# Patient Record
Sex: FEMALE | Race: WHITE | NOT HISPANIC OR LATINO | Employment: FULL TIME | URBAN - NONMETROPOLITAN AREA
[De-identification: names, ages, dates, MRNs, and addresses within clinical notes are randomized per-mention and may not be internally consistent; named-entity substitution may affect disease eponyms.]

---

## 2017-04-01 ENCOUNTER — OFFICE VISIT (OUTPATIENT)
Dept: URGENT CARE | Facility: PHYSICIAN GROUP | Age: 62
End: 2017-04-01
Payer: COMMERCIAL

## 2017-04-01 VITALS
WEIGHT: 163 LBS | BODY MASS INDEX: 26.2 KG/M2 | TEMPERATURE: 98.4 F | RESPIRATION RATE: 16 BRPM | SYSTOLIC BLOOD PRESSURE: 114 MMHG | DIASTOLIC BLOOD PRESSURE: 72 MMHG | HEIGHT: 66 IN | OXYGEN SATURATION: 96 % | HEART RATE: 104 BPM

## 2017-04-01 DIAGNOSIS — R06.2 WHEEZES: ICD-10-CM

## 2017-04-01 DIAGNOSIS — J30.2 SEASONAL ALLERGIC RHINITIS, UNSPECIFIED ALLERGIC RHINITIS TRIGGER: ICD-10-CM

## 2017-04-01 PROCEDURE — 99214 OFFICE O/P EST MOD 30 MIN: CPT | Performed by: PHYSICIAN ASSISTANT

## 2017-04-01 RX ORDER — ALBUTEROL SULFATE 90 UG/1
2 AEROSOL, METERED RESPIRATORY (INHALATION) EVERY 4 HOURS PRN
Qty: 1 INHALER | Refills: 0 | Status: SHIPPED | OUTPATIENT
Start: 2017-04-01 | End: 2017-05-11

## 2017-04-01 RX ORDER — FLUTICASONE PROPIONATE 50 MCG
1 SPRAY, SUSPENSION (ML) NASAL 2 TIMES DAILY
Qty: 1 BOTTLE | Refills: 2 | Status: SHIPPED | OUTPATIENT
Start: 2017-04-01 | End: 2017-12-11

## 2017-04-01 NOTE — PROGRESS NOTES
Chief Complaint   Patient presents with   • URI     x2 months, nasal drainage, wheezing, cough       HISTORY OF PRESENT ILLNESS: Patient is a 61 y.o. female who presents today because she has a 2 month history of nasal congestion, clear runny nose, sinus pressure, dry cough and she has noticed some wheezing. This been going on for about 2 months, she denies any fevers, chills, nausea, vomiting or diarrhea. She tried to take one Benadryl and made her feel very dry so she didn't take anything else    There are no active problems to display for this patient.      Allergies:Sulfa drugs    Current Outpatient Prescriptions Ordered in Psychiatric   Medication Sig Dispense Refill   • fluticasone (FLONASE) 50 MCG/ACT nasal spray Spray 1 Spray in nose 2 times a day. 1 Bottle 2   • albuterol 108 (90 BASE) MCG/ACT Aero Soln inhalation aerosol Inhale 2 Puffs by mouth every four hours as needed. 1 Inhaler 0   • Multiple Vitamins-Minerals (CENTRUM PO) Take  by mouth.     • Thiamine HCl (VITAMIN B-1 PO) Take  by mouth.     • naproxen (NAPROSYN) 500 MG Tab Take 1 Tab by mouth 2 times a day, with meals. 60 Tab 0     No current Epic-ordered facility-administered medications on file.       Past Medical History   Diagnosis Date   • Cancer (CMS-[facility])        Social History   Substance Use Topics   • Smoking status: Former Smoker   • Smokeless tobacco: Never Used   • Alcohol Use: No       No family status information on file.   No family history on file.    ROS:  Review of Systems   Constitutional: Negative for fever, chills, weight loss and malaise/fatigue.   HENT: Negative for ear pain, nosebleeds, positive for nasal congestion, sore throat and no neck pain.    Eyes: Negative for blurred vision.   Respiratory: Positive for cough, no sputum production, shortness of breath and has noticed wheezing.    Cardiovascular: Negative for chest pain, palpitations, orthopnea and leg swelling.   Gastrointestinal: Negative for heartburn, nausea, vomiting and  "abdominal pain.   Genitourinary: Negative for dysuria, urgency and frequency.     Exam:  Blood pressure 114/72, pulse 104, temperature 36.9 °C (98.4 °F), resp. rate 16, height 1.676 m (5' 6\"), weight 73.936 kg (163 lb), SpO2 96 %.  General:  Well nourished, well developed female in NAD  Head:Normocephalic, atraumatic  Eyes: PERRLA, EOM within normal limits, no conjunctival injection, no scleral icterus, visual fields and acuity grossly intact.  Ears: Normal shape and symmetry, no tenderness, no discharge. External canals are without any significant edema or erythema. Tympanic membranes are without any inflammation, no effusion. Gross auditory acuity is intact  Nose: Symmetrical without tenderness, no discharge. Nasal mucosa is edematous and pale bilaterally  Mouth: reasonable hygiene, no erythema exudates or tonsillar enlargement.  Neck: no masses, range of motion within normal limits, no tracheal deviation. No obvious thyroid enlargement.  Pulmonary: chest is symmetrical with respiration, 2 scattered wheezes bilaterally, no rales or rhonchi  Cardiovascular: regular rate and rhythm without murmurs, rubs, or gallops.  Extremities: no clubbing, cyanosis, or edema.    Please note that this dictation was created using voice recognition software. I have made every reasonable attempt to correct obvious errors, but I expect that there are errors of grammar and possibly content that I did not discover before finalizing the note.    Assessment/Plan:  1. Seasonal allergic rhinitis, unspecified allergic rhinitis trigger  fluticasone (FLONASE) 50 MCG/ACT nasal spray   2. Wheezes  albuterol 108 (90 BASE) MCG/ACT Aero Soln inhalation aerosol    recommended over-the-counter Zyrtec D    Followup with primary care in the next 7-10 days if not significantly improving, return to the urgent care or go to the emergency room sooner for any worsening of symptoms.         "

## 2017-04-01 NOTE — MR AVS SNAPSHOT
"        Mariana Dumont   2017 2:00 PM   Office Visit   MRN: [id number]    Department:  [facility]   Dept Phone:  [phone]    Description:  Female : 1955   Provider:  Pranav Santa PA-C           Reason for Visit     URI x2 months, nasal drainage, wheezing, cough      Allergies as of 2017     Allergen Noted Reactions    Sulfa Drugs 2011   Hives      You were diagnosed with     Seasonal allergic rhinitis, unspecified allergic rhinitis trigger   [3808466]       Wheezes   [[id number]]         Vital Signs     Blood Pressure Pulse Temperature Respirations Height Weight    114/72 mmHg 104 36.9 °C (98.4 °F) 16 1.676 m (5' 6\") 73.936 kg (163 lb)    Body Mass Index Oxygen Saturation Smoking Status             26.32 kg/m2 96% Former Smoker         Basic Information     Date Of Birth Sex Race Ethnicity Preferred Language    1955 Female White Non- English      Health Maintenance        Date Due Completion Dates    IMM DTaP/Tdap/Td Vaccine (1 - Tdap) 1973 ---    PAP SMEAR 1975 ---    MAMMOGRAM 1994 ---    IMM ZOSTER VACCINE 2014 ---    COLONOSCOPY 2026            Current Immunizations     No immunizations on file.      Below and/or attached are the medications your provider expects you to take. Review all of your home medications and newly ordered medications with your provider and/or pharmacist. Follow medication instructions as directed by your provider and/or pharmacist. Please keep your medication list with you and share with your provider. Update the information when medications are discontinued, doses are changed, or new medications (including over-the-counter products) are added; and carry medication information at all times in the event of emergency situations     Allergies:  SULFA DRUGS - Hives               Medications  Valid as of: 2017 -  2:24 PM    Generic Name Brand Name Tablet Size Instructions for use    Albuterol Sulfate " (Aero Soln) albuterol 108 (90 BASE) MCG/ACT Inhale 2 Puffs by mouth every four hours as needed.        Fluticasone Propionate (Suspension) FLONASE 50 MCG/ACT Spray 1 Spray in nose 2 times a day.        Multiple Vitamins-Minerals   Take  by mouth.        Naproxen (Tab) NAPROSYN 500 MG Take 1 Tab by mouth 2 times a day, with meals.        Thiamine HCl   Take  by mouth.        .                 Medicines prescribed today were sent to:     [id number] - [place], [place] - [address] AT [facility] [address]    [address] [facility] [address]    Phone: [phone] Fax: [phone]    Open 24 Hours?: No      Medication refill instructions:       If your prescription bottle indicates you have medication refills left, it is not necessary to call your provider’s office. Please contact your pharmacy and they will refill your medication.    If your prescription bottle indicates you do not have any refills left, you may request refills at any time through one of the following ways: The online Fashiontrot system (except Urgent Care), by calling your provider’s office, or by asking your pharmacy to contact your provider’s office with a refill request. Medication refills are processed only during regular business hours and may not be available until the next business day. Your provider may request additional information or to have a follow-up visit with you prior to refilling your medication.   *Please Note: Medication refills are assigned a new Rx number when refilled electronically. Your pharmacy may indicate that no refills were authorized even though a new prescription for the same medication is available at the pharmacy. Please request the medicine by name with the pharmacy before contacting your provider for a refill.           Fashiontrot Access Code: [email or web address]  Expires: 5/1/2017  2:24 PM    Fashiontrot  A secure, online tool to manage your health information     [employer]’s Fashiontrot® is a secure, online  tool that connects you to your personalized health information from the privacy of your home -- day or night - making it very easy for you to manage your healthcare. Once the activation process is completed, you can even access your medical information using the ID90T yola, which is available for free in the Apple Yola store or Google Play store.     ID90T provides the following levels of access (as shown below):   My Chart Features   [facility] Doctor [facility]  Specialists [facility]  Urgent  Care Non-[facility]  Primary Care  Doctor   Email your healthcare team securely and privately 24/7 X X X    Manage appointments: schedule your next appointment; view details of past/upcoming appointments X      Request prescription refills. X      View recent personal medical records, including lab and immunizations X X X X   View health record, including health history, allergies, medications X X X X   Read reports about your outpatient visits, procedures, consult and ER notes X X X X   See your discharge summary, which is a recap of your hospital and/or ER visit that includes your diagnosis, lab results, and care plan. X X       How to register for ID90T:  1. Go to  [email or web address].  2. Click on the Sign Up Now box, which takes you to the New Member Sign Up page. You will need to provide the following information:  a. Enter your ID90T Access Code exactly as it appears at the top of this page. (You will not need to use this code after you’ve completed the sign-up process. If you do not sign up before the expiration date, you must request a new code.)   b. Enter your date of birth.   c. Enter your home email address.   d. Click Submit, and follow the next screen’s instructions.  3. Create a ID90T ID. This will be your ID90T login ID and cannot be changed, so think of one that is secure and easy to remember.  4. Create a ID90T password. You can change your password at any time.  5. Enter your Password  Reset Question and Answer. This can be used at a later time if you forget your password.   6. Enter your e-mail address. This allows you to receive e-mail notifications when new information is available in CultureIQt.  7. Click Sign Up. You can now view your health information.    For assistance activating your SpotlessCity account, call [phone]

## 2017-05-11 ENCOUNTER — OFFICE VISIT (OUTPATIENT)
Dept: URGENT CARE | Facility: PHYSICIAN GROUP | Age: 62
End: 2017-05-11
Payer: COMMERCIAL

## 2017-05-11 VITALS
WEIGHT: 161 LBS | HEIGHT: 66 IN | DIASTOLIC BLOOD PRESSURE: 86 MMHG | BODY MASS INDEX: 25.88 KG/M2 | SYSTOLIC BLOOD PRESSURE: 130 MMHG | TEMPERATURE: 97.9 F | HEART RATE: 85 BPM | OXYGEN SATURATION: 96 % | RESPIRATION RATE: 18 BRPM

## 2017-05-11 DIAGNOSIS — R05.9 COUGH: ICD-10-CM

## 2017-05-11 PROCEDURE — 99214 OFFICE O/P EST MOD 30 MIN: CPT | Performed by: PHYSICIAN ASSISTANT

## 2017-05-11 RX ORDER — ALBUTEROL SULFATE 90 UG/1
2 AEROSOL, METERED RESPIRATORY (INHALATION) EVERY 6 HOURS PRN
Qty: 8.5 G | Refills: 1 | Status: SHIPPED | OUTPATIENT
Start: 2017-05-11

## 2017-05-11 RX ORDER — CODEINE PHOSPHATE AND GUAIFENESIN 10; 100 MG/5ML; MG/5ML
5 SOLUTION ORAL EVERY 6 HOURS PRN
Qty: 100 ML | Refills: 0 | Status: SHIPPED | OUTPATIENT
Start: 2017-05-11 | End: 2017-12-11

## 2017-05-11 RX ORDER — PREDNISONE 10 MG/1
TABLET ORAL
Qty: 20 TAB | Refills: 0 | Status: SHIPPED | OUTPATIENT
Start: 2017-05-11 | End: 2017-12-11

## 2017-05-11 RX ORDER — BENZONATATE 200 MG/1
200 CAPSULE ORAL 3 TIMES DAILY PRN
Qty: 30 CAP | Refills: 0 | Status: SHIPPED | OUTPATIENT
Start: 2017-05-11 | End: 2017-12-11

## 2017-05-11 ASSESSMENT — ENCOUNTER SYMPTOMS
SHORTNESS OF BREATH: 0
COUGH: 1
CHILLS: 0
RHINORRHEA: 1
SPUTUM PRODUCTION: 0
WHEEZING: 1
FEVER: 0
MYALGIAS: 0

## 2017-05-11 NOTE — MR AVS SNAPSHOT
"        Mariana Dumont   2017 4:40 PM   Office Visit   MRN: [id number]    Department:  [facility]   Dept Phone:  [phone]    Description:  Female : 1955   Provider:  JEREMIAS Gonzalez.           Reason for Visit     Cough Over a month, OTC meds with no help      Allergies as of 2017     Allergen Noted Reactions    Sulfa Drugs 2011   Hives      You were diagnosed with     Cough   [786.2.ICD-9-CM]   Nonproductive, fluctuating for couple of months, worse over the last few weeks. No wheezes. Start albuterol and Tessalon. X-ray ordered. Follow up on results      Vital Signs     Blood Pressure Pulse Temperature Respirations Height Weight    130/86 mmHg 85 36.6 °C (97.9 °F) 18 1.676 m (5' 5.98\") 73.029 kg (161 lb)    Body Mass Index Oxygen Saturation Breastfeeding? Smoking Status          26.00 kg/m2 96% No Former Smoker        Basic Information     Date Of Birth Sex Race Ethnicity Preferred Language    1955 Female White Non- English      Health Maintenance        Date Due Completion Dates    IMM DTaP/Tdap/Td Vaccine (1 - Tdap) 1973 ---    PAP SMEAR 1975 ---    MAMMOGRAM 1994 ---    IMM ZOSTER VACCINE 2014 ---    COLONOSCOPY 2026            Current Immunizations     No immunizations on file.      Below and/or attached are the medications your provider expects you to take. Review all of your home medications and newly ordered medications with your provider and/or pharmacist. Follow medication instructions as directed by your provider and/or pharmacist. Please keep your medication list with you and share with your provider. Update the information when medications are discontinued, doses are changed, or new medications (including over-the-counter products) are added; and carry medication information at all times in the event of emergency situations     Allergies:  SULFA DRUGS - Hives               Medications  Valid as of: May 11, 2017 -  5:52 " PM    Generic Name Brand Name Tablet Size Instructions for use    Albuterol Sulfate (Aero Soln) albuterol 108 (90 BASE) MCG/ACT Inhale 2 Puffs by mouth every 6 hours as needed for Shortness of Breath.        Benzonatate (Cap) TESSALON 200 MG Take 1 Cap by mouth 3 times a day as needed for Cough.        Fluticasone Propionate (Suspension) FLONASE 50 MCG/ACT Spray 1 Spray in nose 2 times a day.        Guaifenesin-Codeine (Solution) ROBITUSSIN -10 mg/5mL Take 5 mL by mouth every 6 hours as needed for Cough.        Multiple Vitamins-Minerals   Take  by mouth.        PredniSONE (Tab) DELTASONE 10 MG 2 tabs BID x 2 days, 3 tabs daily x 2 days, 2 tabs daily x 2 days, 1 tab daily x 2 days        Thiamine HCl   Take  by mouth.        .                 Medicines prescribed today were sent to:     [facility] [address] [facility], [place] - [address] [facility] [address]     [address] [facility] [address]    Phone: [phone] Fax: [phone]    Open 24 Hours?: No      Medication refill instructions:       If your prescription bottle indicates you have medication refills left, it is not necessary to call your provider’s office. Please contact your pharmacy and they will refill your medication.    If your prescription bottle indicates you do not have any refills left, you may request refills at any time through one of the following ways: The online [employer] system (except Urgent Care), by calling your provider’s office, or by asking your pharmacy to contact your provider’s office with a refill request. Medication refills are processed only during regular business hours and may not be available until the next business day. Your provider may request additional information or to have a follow-up visit with you prior to refilling your medication.   *Please Note: Medication refills are assigned a new Rx number when refilled electronically. Your pharmacy may indicate that no refills were authorized even though a new  prescription for the same medication is available at the pharmacy. Please request the medicine by name with the pharmacy before contacting your provider for a refill.        Your To Do List     Future Labs/Procedures Complete By Expires    DX-CHEST-2 VIEWS  As directed 5/11/2018      Instructions    Cough, Adult   A cough is a reflex that helps clear your throat and airways. It can help heal the body or may be a reaction to an irritated airway. A cough may only last 2 or 3 weeks (acute) or may last more than 8 weeks (chronic).   CAUSES  Acute cough:  · Viral or bacterial infections.  Chronic cough:  · Infections.  · Allergies.  · Asthma.  · Post-nasal drip.  · Smoking.  · Heartburn or acid reflux.  · Some medicines.  · Chronic lung problems (COPD).  · Cancer.  SYMPTOMS   · Cough.  · Fever.  · Chest pain.  · Increased breathing rate.  · High-pitched whistling sound when breathing (wheezing).  · Colored mucus that you cough up (sputum).  TREATMENT   · A bacterial cough may be treated with antibiotic medicine.  · A viral cough must run its course and will not respond to antibiotics.  · Your caregiver may recommend other treatments if you have a chronic cough.  HOME CARE INSTRUCTIONS   · Only take over-the-counter or prescription medicines for pain, discomfort, or fever as directed by your caregiver. Use cough suppressants only as directed by your caregiver.  · Use a cold steam vaporizer or humidifier in your bedroom or home to help loosen secretions.  · Sleep in a semi-upright position if your cough is worse at night.  · Rest as needed.  · Stop smoking if you smoke.  SEEK IMMEDIATE MEDICAL CARE IF:   · You have pus in your sputum.  · Your cough starts to worsen.  · You cannot control your cough with suppressants and are losing sleep.  · You begin coughing up blood.  · You have difficulty breathing.  · You develop pain which is getting worse or is uncontrolled with medicine.  · You have a fever.  MAKE SURE YOU:    · Understand these instructions.  · Will watch your condition.  · Will get help right away if you are not doing well or get worse.     This information is not intended to replace advice given to you by your health care provider. Make sure you discuss any questions you have with your health care provider.     Document Released: 06/15/2012 Document Revised: 03/11/2013 Document Reviewed: 02/24/2016  [employer] Interactive Patient Education ©2016 Elsevier Inc.            "[employer] Access Code: [email or web address]  Expires: 6/10/2017  5:52 PM    "[employer]."  A secure, online tool to manage your health information     [employer]’s "[employer]."® is a secure, online tool that connects you to your personalized health information from the privacy of your home -- day or night - making it very easy for you to manage your healthcare. Once the activation process is completed, you can even access your medical information using the "[employer]." yola, which is available for free in the Apple Yola store or Google Play store.     "[employer]." provides the following levels of access (as shown below):   My Chart Features   [facility] Primary [facility] Doctor [facility]  Specialists [facility]  Urgent  Care Non-[facility]  Primary Care  Doctor   Email your healthcare team securely and privately 24/7 X X X    Manage appointments: schedule your next appointment; view details of past/upcoming appointments X      Request prescription refills. X      View recent personal medical records, including lab and immunizations X X X X   View health record, including health history, allergies, medications X X X X   Read reports about your outpatient visits, procedures, consult and ER notes X X X X   See your discharge summary, which is a recap of your hospital and/or ER visit that includes your diagnosis, lab results, and care plan. X X       How to register for "[employer]:  1. Go to  [email or web address].  2. Click on the Sign Up Now box, which takes you to the New Member Sign Up page. You  will need to provide the following information:  a. Enter your Redeemr Access Code exactly as it appears at the top of this page. (You will not need to use this code after you’ve completed the sign-up process. If you do not sign up before the expiration date, you must request a new code.)   b. Enter your date of birth.   c. Enter your home email address.   d. Click Submit, and follow the next screen’s instructions.  3. Create a Sococot ID. This will be your Redeemr login ID and cannot be changed, so think of one that is secure and easy to remember.  4. Create a Redeemr password. You can change your password at any time.  5. Enter your Password Reset Question and Answer. This can be used at a later time if you forget your password.   6. Enter your e-mail address. This allows you to receive e-mail notifications when new information is available in Redeemr.  7. Click Sign Up. You can now view your health information.    For assistance activating your Redeemr account, call [phone]

## 2017-05-12 NOTE — PATIENT INSTRUCTIONS
Cough, Adult   A cough is a reflex that helps clear your throat and airways. It can help heal the body or may be a reaction to an irritated airway. A cough may only last 2 or 3 weeks (acute) or may last more than 8 weeks (chronic).   CAUSES  Acute cough:  · Viral or bacterial infections.  Chronic cough:  · Infections.  · Allergies.  · Asthma.  · Post-nasal drip.  · Smoking.  · Heartburn or acid reflux.  · Some medicines.  · Chronic lung problems (COPD).  · Cancer.  SYMPTOMS   · Cough.  · Fever.  · Chest pain.  · Increased breathing rate.  · High-pitched whistling sound when breathing (wheezing).  · Colored mucus that you cough up (sputum).  TREATMENT   · A bacterial cough may be treated with antibiotic medicine.  · A viral cough must run its course and will not respond to antibiotics.  · Your caregiver may recommend other treatments if you have a chronic cough.  HOME CARE INSTRUCTIONS   · Only take over-the-counter or prescription medicines for pain, discomfort, or fever as directed by your caregiver. Use cough suppressants only as directed by your caregiver.  · Use a cold steam vaporizer or humidifier in your bedroom or home to help loosen secretions.  · Sleep in a semi-upright position if your cough is worse at night.  · Rest as needed.  · Stop smoking if you smoke.  SEEK IMMEDIATE MEDICAL CARE IF:   · You have pus in your sputum.  · Your cough starts to worsen.  · You cannot control your cough with suppressants and are losing sleep.  · You begin coughing up blood.  · You have difficulty breathing.  · You develop pain which is getting worse or is uncontrolled with medicine.  · You have a fever.  MAKE SURE YOU:   · Understand these instructions.  · Will watch your condition.  · Will get help right away if you are not doing well or get worse.     This information is not intended to replace advice given to you by your health care provider. Make sure you discuss any questions you have with your health care provider.      Document Released: 06/15/2012 Document Revised: 03/11/2013 Document Reviewed: 02/24/2016  [employer] Interactive Patient Education ©2016 Elsevier Inc.

## 2017-05-12 NOTE — PROGRESS NOTES
Subjective:      Mariana Dumont is a 61 y.o. female who presents with Cough            HPI Comments: Patient reports cough for the last couple of months which has been fluctuating. She reports that she was seen about a month ago and given albuterol which helped a little bit, but she has run out. She does report some wheezing. No sputum, fever, or other complaints. Her daughter is concerned and would like her to get a chest x-ray today. Patient does not have a PCP as her PCP left the area a while back. She has tried OTC medications with limited improvement in symptoms. She reports the cough is not keeping her awake at night.    Cough  This is a new problem. The current episode started more than 1 month ago. The problem has been waxing and waning. The cough is non-productive. Associated symptoms include rhinorrhea and wheezing. Pertinent negatives include no chills, fever, myalgias or shortness of breath. Nothing aggravates the symptoms. She has tried a beta-agonist inhaler and OTC cough suppressant for the symptoms. The treatment provided mild relief.       Review of Systems   Constitutional: Negative for fever and chills.   HENT: Positive for rhinorrhea.    Respiratory: Positive for cough and wheezing. Negative for sputum production and shortness of breath.    Musculoskeletal: Negative for myalgias.     Allergies:Sulfa drugs    Current Outpatient Prescriptions Ordered in [facility]   Medication Sig Dispense Refill   • albuterol 108 (90 BASE) MCG/ACT Aero Soln inhalation aerosol Inhale 2 Puffs by mouth every 6 hours as needed for Shortness of Breath. 8.5 g 1   • benzonatate (TESSALON) 200 MG capsule Take 1 Cap by mouth 3 times a day as needed for Cough. 30 Cap 0   • predniSONE (DELTASONE) 10 MG Tab 2 tabs BID x 2 days, 3 tabs daily x 2 days, 2 tabs daily x 2 days, 1 tab daily x 2 days 20 Tab 0   • guaifenesin-codeine (ROBITUSSIN AC) Solution oral solution Take 5 mL by mouth every 6 hours as needed for Cough. 100 mL 0   •  "fluticasone (FLONASE) 50 MCG/ACT nasal spray Spray 1 Spray in nose 2 times a day. 1 Bottle 2   • Multiple Vitamins-Minerals (CENTRUM PO) Take  by mouth.     • Thiamine HCl (VITAMIN B-1 PO) Take  by mouth.       No current Epic-ordered facility-administered medications on file.       Past Medical History   Diagnosis Date   • Cancer (CMS-[facility])        Social History   Substance Use Topics   • Smoking status: Former Smoker   • Smokeless tobacco: Never Used   • Alcohol Use: No       No family status information on file.   History reviewed. No pertinent family history.       Objective:     /86 mmHg  Pulse 85  Temp(Src) 36.6 °C (97.9 °F)  Resp 18  Ht 1.676 m (5' 5.98\")  Wt 73.029 kg (161 lb)  BMI 26.00 kg/m2  SpO2 96%  Breastfeeding? No     Physical Exam   Constitutional: She is oriented to person, place, and time. She appears well-developed and well-nourished. No distress.   HENT:   Head: Normocephalic and atraumatic.   Neck: Normal range of motion. Neck supple.   Cardiovascular: Normal rate and regular rhythm.    Pulmonary/Chest: Effort normal and breath sounds normal. She has no wheezes. She has no rales.   Frequent harsh, nonproductive cough   Neurological: She is alert and oriented to person, place, and time.   Skin: Skin is warm and dry. She is not diaphoretic.   Psychiatric: She has a normal mood and affect. Her behavior is normal. Judgment and thought content normal.   Nursing note and vitals reviewed.    X-ray: Chest x-ray pending          Assessment/Plan:     1. Cough  albuterol 108 (90 BASE) MCG/ACT Aero Soln inhalation aerosol    DX-CHEST-2 VIEWS    benzonatate (TESSALON) 200 MG capsule    predniSONE (DELTASONE) 10 MG Tab    guaifenesin-codeine (ROBITUSSIN AC) Solution oral solution    Nonproductive, fluctuating for couple of months, worse over the last few weeks. No wheezes. Start albuterol and Tessalon. X-ray ordered. Follow up on results       [employer] Interactive Patient Education given: " Cough    Please note that this dictation was created using voice recognition software. I have made every reasonable attempt to correct obvious errors, but I expect that there are errors of grammar and possibly content that I did not discover before finalizing the note.

## 2017-05-13 ENCOUNTER — HOSPITAL ENCOUNTER (OUTPATIENT)
Dept: RADIOLOGY | Facility: MEDICAL CENTER | Age: 62
End: 2017-05-13
Attending: PHYSICIAN ASSISTANT
Payer: COMMERCIAL

## 2017-05-13 DIAGNOSIS — R05.9 COUGH: ICD-10-CM

## 2017-05-13 PROCEDURE — 71020 DX-CHEST-2 VIEWS: CPT

## 2017-12-11 ENCOUNTER — OFFICE VISIT (OUTPATIENT)
Dept: URGENT CARE | Facility: PHYSICIAN GROUP | Age: 62
End: 2017-12-11
Payer: COMMERCIAL

## 2017-12-11 VITALS
BODY MASS INDEX: 26.03 KG/M2 | RESPIRATION RATE: 20 BRPM | SYSTOLIC BLOOD PRESSURE: 136 MMHG | DIASTOLIC BLOOD PRESSURE: 64 MMHG | OXYGEN SATURATION: 99 % | WEIGHT: 162 LBS | HEIGHT: 66 IN | HEART RATE: 69 BPM | TEMPERATURE: 98.6 F

## 2017-12-11 DIAGNOSIS — L03.011 PARONYCHIA OF FINGER, RIGHT: ICD-10-CM

## 2017-12-11 PROCEDURE — 99214 OFFICE O/P EST MOD 30 MIN: CPT | Performed by: NURSE PRACTITIONER

## 2017-12-11 RX ORDER — AMOXICILLIN AND CLAVULANATE POTASSIUM 875; 125 MG/1; MG/1
1 TABLET, FILM COATED ORAL 2 TIMES DAILY
Qty: 14 TAB | Refills: 0 | Status: SHIPPED | OUTPATIENT
Start: 2017-12-11 | End: 2017-12-18

## 2017-12-11 ASSESSMENT — ENCOUNTER SYMPTOMS
NUMBNESS: 0
CHILLS: 0
EYE PAIN: 0
NAUSEA: 0
MYALGIAS: 0
VOMITING: 0
SHORTNESS OF BREATH: 0
FEVER: 0
SORE THROAT: 0
DIZZINESS: 0

## 2017-12-12 NOTE — PROGRESS NOTES
"  Subjective:     Mariana Dumont is a 62 y.o. female who presents for Finger Injury (right middle finger infection)       Other   This is a new problem. The current episode started in the past 7 days. The problem occurs constantly. The problem has been rapidly improving. Pertinent negatives include no chest pain, chills, fever, myalgias, nausea, numbness, rash, sore throat or vomiting. Nothing aggravates the symptoms. Treatments tried: drained with needle, topical abx ointment. The treatment provided mild relief.     Past Medical History:   Diagnosis Date   • Cancer (CMS-HCC)      Past Surgical History:   Procedure Laterality Date   • ABDOMINAL HYSTERECTOMY TOTAL     • PRIMARY C SECTION       Social History     Social History   • Marital status:      Spouse name: N/A   • Number of children: N/A   • Years of education: N/A     Occupational History   • Not on file.     Social History Main Topics   • Smoking status: Former Smoker   • Smokeless tobacco: Never Used   • Alcohol use No   • Drug use: No   • Sexual activity: Not on file     Other Topics Concern   • Not on file     Social History Narrative   • No narrative on file    History reviewed. No pertinent family history. Review of Systems   Constitutional: Negative for chills and fever.   HENT: Negative for sore throat.    Eyes: Negative for pain.   Respiratory: Negative for shortness of breath.    Cardiovascular: Negative for chest pain.   Gastrointestinal: Negative for nausea and vomiting.   Genitourinary: Negative for hematuria.   Musculoskeletal: Negative for myalgias.   Skin: Negative for rash.   Neurological: Negative for dizziness and numbness.     Allergies   Allergen Reactions   • Sulfa Drugs Hives      Objective:   /64   Pulse 69   Temp 37 °C (98.6 °F)   Resp 20   Ht 1.676 m (5' 6\")   Wt 73.5 kg (162 lb)   SpO2 99%   BMI 26.15 kg/m²   Physical Exam   Constitutional: She is oriented to person, place, and time. She appears well-developed " and well-nourished. No distress.   HENT:   Head: Normocephalic and atraumatic.   Eyes: Conjunctivae and EOM are normal. Pupils are equal, round, and reactive to light.   Cardiovascular: Normal rate and regular rhythm.    No murmur heard.  Pulmonary/Chest: Effort normal and breath sounds normal. No respiratory distress.   Abdominal: Soft. She exhibits no distension. There is no tenderness.   Neurological: She is alert and oriented to person, place, and time. She has normal reflexes. No sensory deficit.   Skin: Skin is warm and dry. There is erythema.         erythema, tender to palpation of the cuticle.   Psychiatric: She has a normal mood and affect.         Assessment/Plan:   Assessment    1. Paronychia of finger, right  - amoxicillin-clavulanate (AUGMENTIN) 875-125 MG Tab; Take 1 Tab by mouth 2 times a day for 7 days.  Dispense: 14 Tab; Refill: 0     Patient was able to drain finger with a needle and get puss out. At this time and will not re-drain. We'll place on oral antibiotics. Patient advised of signs and symptoms of worsening condition. Patient advised to return to clinic    Patient given precautionary s/sx that mandate immediate follow up and evaluation in the ED. Advised of risks of not doing so.    DDX, Supportive care, and indications for immediate follow-up discussed with patient.    Instructed to return to clinic or nearest emergency department if we are not available for any change in condition, further concerns, or worsening of symptoms.    The patient demonstrated a good understanding and agreed with the treatment plan.  If not improved with oral antibiotics.

## 2017-12-14 ENCOUNTER — SUPERVISING PHYSICIAN REVIEW (OUTPATIENT)
Dept: URGENT CARE | Facility: PHYSICIAN GROUP | Age: 62
End: 2017-12-14

## 2017-12-23 ENCOUNTER — OFFICE VISIT (OUTPATIENT)
Dept: URGENT CARE | Facility: PHYSICIAN GROUP | Age: 62
End: 2017-12-23
Payer: COMMERCIAL

## 2017-12-23 VITALS
HEART RATE: 91 BPM | HEIGHT: 66 IN | DIASTOLIC BLOOD PRESSURE: 76 MMHG | OXYGEN SATURATION: 94 % | RESPIRATION RATE: 16 BRPM | WEIGHT: 162 LBS | TEMPERATURE: 100.2 F | BODY MASS INDEX: 26.03 KG/M2 | SYSTOLIC BLOOD PRESSURE: 120 MMHG

## 2017-12-23 DIAGNOSIS — J22 ACUTE RESPIRATORY INFECTION: ICD-10-CM

## 2017-12-23 PROCEDURE — 99214 OFFICE O/P EST MOD 30 MIN: CPT | Performed by: FAMILY MEDICINE

## 2017-12-23 RX ORDER — AZITHROMYCIN 250 MG/1
TABLET, FILM COATED ORAL
Qty: 6 TAB | Refills: 0 | Status: SHIPPED | OUTPATIENT
Start: 2017-12-23

## 2017-12-23 RX ORDER — CODEINE PHOSPHATE AND GUAIFENESIN 10; 100 MG/5ML; MG/5ML
SOLUTION ORAL
Qty: 240 ML | Refills: 0 | Status: SHIPPED | OUTPATIENT
Start: 2017-12-23 | End: 2017-12-30

## 2017-12-23 RX ORDER — BENZONATATE 200 MG/1
CAPSULE ORAL
Qty: 30 CAP | Refills: 0 | Status: SHIPPED | OUTPATIENT
Start: 2017-12-23

## 2017-12-23 NOTE — PROGRESS NOTES
Chief Complaint:    Chief Complaint   Patient presents with   • Cough     x2days congestion       History of Present Illness:    This is a new problem. Symptoms x 2 days; has nasal symptoms and cough. No fever that she was aware of, but she has temp of 100.2 F here. No purulent mucus. Tessalon and Guiafenesin-Codeine have been helpful for cough in past.      Review of Systems:    Constitutional: See HPI.  Eyes: Negative for change in vision, photophobia, pain, redness, and discharge.  ENT: See HPI.    Respiratory: See HPI.   Cardiovascular: Negative for chest pain, palpitations, orthopnea, claudication, leg swelling, and PND.   Gastrointestinal: Negative for abdominal pain, nausea, vomiting, diarrhea, constipation, blood in stool, and melena.   Genitourinary: Negative for dysuria, urinary urgency, urinary frequency, hematuria, and flank pain.   Musculoskeletal: Negative for myalgias, joint pain, neck pain, and back pain.   Skin: Negative for rash and itching.   Neurological: Negative for dizziness, tingling, tremors, sensory change, speech change, focal weakness, seizures, loss of consciousness, and headaches.   Endo: Negative for polydipsia.   Heme: Does not bruise/bleed easily.   Psychiatric/Behavioral: Negative for depression, suicidal ideas, hallucinations, memory loss and substance abuse. The patient is not nervous/anxious and does not have insomnia.      Past Medical History:    Past Medical History:   Diagnosis Date   • Cancer (CMS-HCC)        Past Surgical History:    Past Surgical History:   Procedure Laterality Date   • ABDOMINAL HYSTERECTOMY TOTAL     • PRIMARY C SECTION         Social History:    Social History     Social History   • Marital status:      Spouse name: N/A   • Number of children: N/A   • Years of education: N/A     Occupational History   • Not on file.     Social History Main Topics   • Smoking status: Former Smoker   • Smokeless tobacco: Never Used   • Alcohol use No   • Drug use:  "No   • Sexual activity: Not on file     Other Topics Concern   • Not on file     Social History Narrative   • No narrative on file       Family History:    History reviewed. No pertinent family history.    Medications:    Current Outpatient Prescriptions on File Prior to Visit   Medication Sig Dispense Refill   • Multiple Vitamins-Minerals (CENTRUM PO) Take  by mouth.     • Thiamine HCl (VITAMIN B-1 PO) Take  by mouth.     • albuterol 108 (90 BASE) MCG/ACT Aero Soln inhalation aerosol Inhale 2 Puffs by mouth every 6 hours as needed for Shortness of Breath. 8.5 g 1     No current facility-administered medications on file prior to visit.        Allergies:    Allergies   Allergen Reactions   • Sulfa Drugs Hives       Vitals:    Vitals:    12/23/17 1050   BP: 120/76   Pulse: 91   Resp: 16   Temp: 37.9 °C (100.2 °F)   SpO2: 94%   Weight: 73.5 kg (162 lb)   Height: 1.676 m (5' 5.98\")       Physical Exam:    Constitutional: Vital signs reviewed. Appears well-developed and well-nourished. No acute distress.   Eyes: Sclera white, conjunctivae clear.   ENT: External ears normal. External auditory canals normal without discharge. TMs translucent and non-bulging. Hearing normal. Nasal mucosa pink. Lips/teeth are normal. Oral mucosa pink and moist. Posterior pharynx: WNL.  Neck: Neck supple.   Cardiovascular: Regular rate and rhythm. No murmur.  Pulmonary/Chest: Respirations non-labored. Clear to auscultation bilaterally.  Lymph: Cervical nodes without tenderness or enlargement.  Musculoskeletal: Normal gait. Normal range of motion. No muscular atrophy or weakness.  Neurological: Alert and oriented to person, place, and time. Muscle tone normal. Coordination normal.   Skin: No rashes or lesions. Warm, dry, normal turgor.  Psychiatric: Normal mood and affect. Behavior is normal. Judgment and thought content normal.       Assessment / Plan:    1. Acute respiratory infection  - guaifenesin-codeine (ROBITUSSIN AC) Solution oral " solution; 5-10 ML (1-2 TEASPOONS) EVERY 6 HOURS ONLY IF NEEDED FOR COUGH. MAY CAUSE DROWSINESS.  Dispense: 240 mL; Refill: 0  - benzonatate (TESSALON) 200 MG capsule; 1 CAP UP TO 3 TIMES A DAY ONLY IF NEEDED FOR COUGH.  Dispense: 30 Cap; Refill: 0  - azithromycin (ZITHROMAX) 250 MG Tab; 2 TABS ON DAY 1, 1 TAB ON DAYS 2-5.  Dispense: 6 Tab; Refill: 0      Discussed with her DDX and management options.    Will further observe and agreeable to cough medications prescribed.  report checked - no Rx x 3 years.    Back-up Rx for Z-humaira antibiotic she will fill and take if getting much worse or not better after ~ 1 week of symptoms.    Follow-up with PCP or urgent care if getting worse or not better with above.

## 2018-11-30 ENCOUNTER — OFFICE VISIT (OUTPATIENT)
Dept: URGENT CARE | Facility: PHYSICIAN GROUP | Age: 63
End: 2018-11-30
Payer: COMMERCIAL

## 2018-11-30 VITALS
HEIGHT: 66 IN | BODY MASS INDEX: 27.48 KG/M2 | DIASTOLIC BLOOD PRESSURE: 86 MMHG | WEIGHT: 171 LBS | OXYGEN SATURATION: 93 % | TEMPERATURE: 98.1 F | HEART RATE: 84 BPM | RESPIRATION RATE: 16 BRPM | SYSTOLIC BLOOD PRESSURE: 128 MMHG

## 2018-11-30 DIAGNOSIS — S99.911A INJURY OF RIGHT ANKLE, INITIAL ENCOUNTER: ICD-10-CM

## 2018-11-30 PROCEDURE — 99214 OFFICE O/P EST MOD 30 MIN: CPT | Performed by: PHYSICIAN ASSISTANT

## 2018-11-30 RX ORDER — NAPROXEN 500 MG/1
500 TABLET ORAL 2 TIMES DAILY WITH MEALS
Qty: 60 TAB | Refills: 0 | Status: SHIPPED | OUTPATIENT
Start: 2018-11-30

## 2018-11-30 NOTE — PROGRESS NOTES
Chief Complaint   Patient presents with   • Ankle Pain       HISTORY OF PRESENT ILLNESS: Patient is a 63 y.o. female who presents today because she stepped off of a step at home this morning and injured her right ankle.  She has pain on either side of her ankle.  No distal paresthesias.  She is able to walk on it but it hurts.  She has been using ice and ibuprofen and an Ace wrap with some improvement in her symptoms today.    There are no active problems to display for this patient.      Allergies:Sulfa drugs    Current Outpatient Prescriptions Ordered in [facility]   Medication Sig Dispense Refill   • naproxen (NAPROSYN) 500 MG Tab Take 1 Tab by mouth 2 times a day, with meals. 60 Tab 0   • benzonatate (TESSALON) 200 MG capsule 1 CAP UP TO 3 TIMES A DAY ONLY IF NEEDED FOR COUGH. 30 Cap 0   • azithromycin (ZITHROMAX) 250 MG Tab 2 TABS ON DAY 1, 1 TAB ON DAYS 2-5. 6 Tab 0   • albuterol 108 (90 BASE) MCG/ACT Aero Soln inhalation aerosol Inhale 2 Puffs by mouth every 6 hours as needed for Shortness of Breath. 8.5 g 1   • Multiple Vitamins-Minerals (CENTRUM PO) Take  by mouth.     • Thiamine HCl (VITAMIN B-1 PO) Take  by mouth.       No current Epic-ordered facility-administered medications on file.        Past Medical History:   Diagnosis Date   • Cancer (HCC)        Social History   Substance Use Topics   • Smoking status: Former Smoker   • Smokeless tobacco: Never Used   • Alcohol use No       No family status information on file.   No family history on file.    ROS:  Review of Systems   Constitutional: Negative for fever, chills, weight loss and malaise/fatigue.   HENT: Negative for ear pain, nosebleeds, congestion, sore throat and neck pain.    Eyes: Negative for blurred vision.   Respiratory: Negative for cough, sputum production, shortness of breath and wheezing.    Cardiovascular: Negative for chest pain, palpitations, orthopnea and leg swelling.   Gastrointestinal: Negative for heartburn, nausea, vomiting and  "abdominal pain.   Genitourinary: Negative for dysuria, urgency and frequency.     Exam:  Blood pressure 128/86, pulse 84, temperature 36.7 °C (98.1 °F), temperature source Temporal, resp. rate 16, height 1.676 m (5' 6\"), weight 77.6 kg (171 lb), SpO2 93 %, not currently breastfeeding.  General:  Well nourished, well developed female in NAD  Head:Normocephalic, atraumatic  Eyes: PERRLA, EOM within normal limits, no conjunctival injection, no scleral icterus, visual fields and acuity grossly intact.  Extremities: no clubbing, cyanosis.  Lateral malleolus has mild swelling without ecchymosis.  She has tenderness about the malleolus as well as point tenderness over the lateral malleolus.  She has some medial malleolar tenderness as well.  She has good distal circulation and sensation, somewhat reduced range of motion secondary to pain.    Please note that this dictation was created using voice recognition software. I have made every reasonable attempt to correct obvious errors, but I expect that there are errors of grammar and possibly content that I did not discover before finalizing the note.    Assessment/Plan:  1. Injury of right ankle, initial encounter  DX-ANKLE 3+ VIEWS RIGHT    naproxen (NAPROSYN) 500 MG Tab   Pending x-ray, recommended ice, over-the-counter anti-inflammatory, Ace wrap and gentle range of motion.    Followup with primary care in the next 7-10 days if not significantly improving, return to the urgent care or go to the emergency room sooner for any worsening of symptoms.       "

## 2018-12-01 ENCOUNTER — APPOINTMENT (OUTPATIENT)
Dept: RADIOLOGY | Facility: IMAGING CENTER | Age: 63
End: 2018-12-01
Attending: PHYSICIAN ASSISTANT
Payer: COMMERCIAL

## 2018-12-01 ENCOUNTER — APPOINTMENT (OUTPATIENT)
Dept: URGENT CARE | Facility: PHYSICIAN GROUP | Age: 63
End: 2018-12-01
Payer: COMMERCIAL

## 2018-12-01 DIAGNOSIS — S99.911A INJURY OF RIGHT ANKLE, INITIAL ENCOUNTER: ICD-10-CM

## 2018-12-01 PROCEDURE — 73610 X-RAY EXAM OF ANKLE: CPT | Mod: TC,FY,RT | Performed by: FAMILY MEDICINE

## 2019-01-09 ENCOUNTER — OFFICE VISIT (OUTPATIENT)
Dept: URGENT CARE | Facility: PHYSICIAN GROUP | Age: 64
End: 2019-01-09
Payer: COMMERCIAL

## 2019-01-09 VITALS
OXYGEN SATURATION: 95 % | SYSTOLIC BLOOD PRESSURE: 120 MMHG | HEIGHT: 66 IN | DIASTOLIC BLOOD PRESSURE: 78 MMHG | TEMPERATURE: 97.2 F | BODY MASS INDEX: 27.16 KG/M2 | WEIGHT: 169 LBS | RESPIRATION RATE: 12 BRPM | HEART RATE: 77 BPM

## 2019-01-09 DIAGNOSIS — J40 BRONCHITIS: ICD-10-CM

## 2019-01-09 PROCEDURE — 99214 OFFICE O/P EST MOD 30 MIN: CPT | Performed by: PHYSICIAN ASSISTANT

## 2019-01-09 RX ORDER — METHYLPREDNISOLONE 4 MG/1
TABLET ORAL
Qty: 1 KIT | Refills: 0 | Status: SHIPPED | OUTPATIENT
Start: 2019-01-09

## 2019-01-09 RX ORDER — BENZONATATE 100 MG/1
200 CAPSULE ORAL 3 TIMES DAILY PRN
Qty: 30 CAP | Refills: 0 | Status: SHIPPED | OUTPATIENT
Start: 2019-01-09

## 2019-01-09 ASSESSMENT — ENCOUNTER SYMPTOMS
DIARRHEA: 0
VOMITING: 0
EYE DISCHARGE: 0
SPUTUM PRODUCTION: 0
EYE REDNESS: 0
WHEEZING: 1
RHINORRHEA: 0
COUGH: 1
DIZZINESS: 0
MYALGIAS: 0
SORE THROAT: 0
SHORTNESS OF BREATH: 0
FEVER: 0

## 2019-01-10 NOTE — PROGRESS NOTES
"Subjective:      Mariana Dumont is a 63 y.o. female who presents with Cough (x2wks)            Patient is a 63-year-old female who presents to urgent care with cough for the last 2-3 weeks.  Patient reports that it began with a \"head cold \"and subsequently developed the dry cough.  She denies any fevers, chills, shortness of breath.  She does report slight wheezing however utilize rescue inhaler although she did not like how it made her feel.  Patient has been utilizing DayQuil with minimal relief of symptoms.      Cough   This is a new problem. The current episode started 1 to 4 weeks ago. The problem has been waxing and waning. The problem occurs every few minutes. The cough is non-productive. Associated symptoms include wheezing. Pertinent negatives include no chest pain, ear pain, eye redness, fever, myalgias, nasal congestion, rash, rhinorrhea, sore throat or shortness of breath. She has tried OTC cough suppressant for the symptoms. The treatment provided mild relief. Her past medical history is significant for bronchitis.       Review of Systems   Constitutional: Negative for fever and malaise/fatigue.   HENT: Negative for congestion, ear pain, rhinorrhea and sore throat.    Eyes: Negative for discharge and redness.   Respiratory: Positive for cough and wheezing. Negative for sputum production and shortness of breath.    Cardiovascular: Negative for chest pain.   Gastrointestinal: Negative for diarrhea and vomiting.   Genitourinary: Negative for dysuria and urgency.   Musculoskeletal: Negative for myalgias.   Skin: Negative for itching and rash.   Neurological: Negative for dizziness.   All other systems reviewed and are negative.         Objective:     /78   Pulse 77   Temp 36.2 °C (97.2 °F)   Resp 12   Ht 1.676 m (5' 6\")   Wt 76.7 kg (169 lb)   SpO2 95%   Breastfeeding? No   BMI 27.28 kg/m²    PMH:  has a past medical history of Cancer (HCC).  MEDS:   Current Outpatient Prescriptions:   •  " MethylPREDNISolone (MEDROL DOSEPAK) 4 MG Tablet Therapy Pack, UAD, Disp: 1 Kit, Rfl: 0  •  benzonatate (TESSALON) 100 MG Cap, Take 2 Caps by mouth 3 times a day as needed for Cough., Disp: 30 Cap, Rfl: 0  •  albuterol 108 (90 BASE) MCG/ACT Aero Soln inhalation aerosol, Inhale 2 Puffs by mouth every 6 hours as needed for Shortness of Breath., Disp: 8.5 g, Rfl: 1  •  Multiple Vitamins-Minerals (CENTRUM PO), Take  by mouth., Disp: , Rfl:   •  Thiamine HCl (VITAMIN B-1 PO), Take  by mouth., Disp: , Rfl:   •  naproxen (NAPROSYN) 500 MG Tab, Take 1 Tab by mouth 2 times a day, with meals. (Patient not taking: Reported on 1/9/2019), Disp: 60 Tab, Rfl: 0  •  benzonatate (TESSALON) 200 MG capsule, 1 CAP UP TO 3 TIMES A DAY ONLY IF NEEDED FOR COUGH. (Patient not taking: Reported on 1/9/2019), Disp: 30 Cap, Rfl: 0  •  azithromycin (ZITHROMAX) 250 MG Tab, 2 TABS ON DAY 1, 1 TAB ON DAYS 2-5. (Patient not taking: Reported on 1/9/2019), Disp: 6 Tab, Rfl: 0  ALLERGIES:   Allergies   Allergen Reactions   • Sulfa Drugs Hives     SURGHX:   Past Surgical History:   Procedure Laterality Date   • ABDOMINAL HYSTERECTOMY TOTAL     • PRIMARY C SECTION       SOCHX:  reports that she has quit smoking. She has never used smokeless tobacco. She reports that she does not drink alcohol or use drugs.  FH: Family history was reviewed, no pertinent findings to report    Physical Exam   Constitutional: She is oriented to person, place, and time. She appears well-developed and well-nourished. No distress.   HENT:   Head: Normocephalic and atraumatic.   Right Ear: External ear normal.   Left Ear: External ear normal.   Mouth/Throat: Oropharynx is clear and moist. No oropharyngeal exudate.   Eyes: Pupils are equal, round, and reactive to light. Conjunctivae and EOM are normal.   Neck: Normal range of motion. Neck supple. No tracheal deviation present.   Cardiovascular: Normal rate and regular rhythm.    No murmur heard.  Pulmonary/Chest: Effort normal  and breath sounds normal. No respiratory distress.   Harsh dry, bronchial cough throughout duration of the visit today.    Musculoskeletal: Normal range of motion. She exhibits no edema.   Neurological: She is alert and oriented to person, place, and time. Coordination normal.   Skin: Skin is warm. No rash noted.   Psychiatric: She has a normal mood and affect. Her behavior is normal. Judgment and thought content normal.   Vitals reviewed.              Assessment/Plan:     1. Bronchitis  - MethylPREDNISolone (MEDROL DOSEPAK) 4 MG Tablet Therapy Pack; UAD  Dispense: 1 Kit; Refill: 0  - benzonatate (TESSALON) 100 MG Cap; Take 2 Caps by mouth 3 times a day as needed for Cough.  Dispense: 30 Cap; Refill: 0     Encouraged OTC supportive meds PRN. Humidification, increase fluids, avoid night time dairy.   Patient given precautionary s/sx that mandate immediate follow up and evaluation in the ED. Advised of risks of not doing so.    DDX, Supportive care, and indications for immediate follow-up discussed with patient.    Instructed to return to clinic or nearest emergency department if we are not available for any change in condition, further concerns, or worsening of symptoms.    The patient demonstrated a good understanding and agreed with the treatment plan.

## 2019-03-06 ENCOUNTER — OFFICE VISIT (OUTPATIENT)
Dept: URGENT CARE | Facility: PHYSICIAN GROUP | Age: 64
End: 2019-03-06
Payer: COMMERCIAL

## 2019-03-06 VITALS
DIASTOLIC BLOOD PRESSURE: 86 MMHG | TEMPERATURE: 98.4 F | RESPIRATION RATE: 16 BRPM | WEIGHT: 169 LBS | SYSTOLIC BLOOD PRESSURE: 122 MMHG | BODY MASS INDEX: 27.16 KG/M2 | OXYGEN SATURATION: 94 % | HEIGHT: 66 IN | HEART RATE: 92 BPM

## 2019-03-06 DIAGNOSIS — K29.00 ACUTE GASTRITIS WITHOUT HEMORRHAGE, UNSPECIFIED GASTRITIS TYPE: ICD-10-CM

## 2019-03-06 DIAGNOSIS — R11.0 NAUSEA: ICD-10-CM

## 2019-03-06 PROCEDURE — 99214 OFFICE O/P EST MOD 30 MIN: CPT | Performed by: PHYSICIAN ASSISTANT

## 2019-03-06 RX ORDER — OMEPRAZOLE 20 MG/1
20 CAPSULE, DELAYED RELEASE ORAL DAILY
Qty: 30 CAP | Refills: 0 | Status: SHIPPED | OUTPATIENT
Start: 2019-03-06

## 2019-03-06 RX ORDER — ONDANSETRON 4 MG/1
4 TABLET, FILM COATED ORAL EVERY 8 HOURS PRN
Qty: 20 TAB | Refills: 0 | Status: SHIPPED | OUTPATIENT
Start: 2019-03-06

## 2019-03-06 NOTE — PROGRESS NOTES
Chief Complaint   Patient presents with   • Nausea       HISTORY OF PRESENT ILLNESS: Patient is a 63 y.o. female who presents today because 5 nights ago she woke up at approximately 10 PM, had cold sweats and had episodes of vomiting and nausea.  She notes that she has not vomited since then but has had continuous nausea and some midepigastric abdominal discomfort.  She took some Advil for sprain of her foot and that did not help either.    There are no active problems to display for this patient.      Allergies:Sulfa drugs    Current Outpatient Prescriptions Ordered in [facility]   Medication Sig Dispense Refill   • omeprazole (PRILOSEC) 20 MG delayed-release capsule Take 1 Cap by mouth every day. 30 Cap 0   • ondansetron (ZOFRAN) 4 MG Tab tablet Take 1 Tab by mouth every 8 hours as needed for Nausea/Vomiting. 20 Tab 0   • MethylPREDNISolone (MEDROL DOSEPAK) 4 MG Tablet Therapy Pack UAD 1 Kit 0   • benzonatate (TESSALON) 100 MG Cap Take 2 Caps by mouth 3 times a day as needed for Cough. 30 Cap 0   • naproxen (NAPROSYN) 500 MG Tab Take 1 Tab by mouth 2 times a day, with meals. (Patient not taking: Reported on 1/9/2019) 60 Tab 0   • benzonatate (TESSALON) 200 MG capsule 1 CAP UP TO 3 TIMES A DAY ONLY IF NEEDED FOR COUGH. (Patient not taking: Reported on 1/9/2019) 30 Cap 0   • azithromycin (ZITHROMAX) 250 MG Tab 2 TABS ON DAY 1, 1 TAB ON DAYS 2-5. (Patient not taking: Reported on 1/9/2019) 6 Tab 0   • albuterol 108 (90 BASE) MCG/ACT Aero Soln inhalation aerosol Inhale 2 Puffs by mouth every 6 hours as needed for Shortness of Breath. 8.5 g 1   • Multiple Vitamins-Minerals (CENTRUM PO) Take  by mouth.     • Thiamine HCl (VITAMIN B-1 PO) Take  by mouth.       No current Epic-ordered facility-administered medications on file.        Past Medical History:   Diagnosis Date   • Cancer (HCC)        Social History   Substance Use Topics   • Smoking status: Former Smoker   • Smokeless tobacco: Never Used   • Alcohol use No       No  "family status information on file.   No family history on file.    ROS:  Review of Systems   Constitutional: Negative for fever, chills, weight loss and malaise/fatigue.   HENT: Negative for ear pain, nosebleeds, congestion, sore throat and neck pain.    Eyes: Negative for blurred vision.   Respiratory: Negative for cough, sputum production, shortness of breath and wheezing.    Cardiovascular: Negative for chest pain, palpitations, orthopnea and leg swelling.   Gastrointestinal: Positive for for heartburn, nausea, vomiting and midepigastric abdominal pain.   Genitourinary: Negative for dysuria, urgency and frequency.     Exam:  Blood pressure 122/86, pulse 92, temperature 36.9 °C (98.4 °F), temperature source Temporal, resp. rate 16, height 1.676 m (5' 6\"), weight 76.7 kg (169 lb), SpO2 94 %, not currently breastfeeding.  General:  Well nourished, well developed female in NAD  Head:Normocephalic, atraumatic  Eyes: PERRLA, EOM within normal limits, no conjunctival injection, no scleral icterus, visual fields and acuity grossly intact.  Nose: Symmetrical without tenderness, no discharge.  Mouth: reasonable hygiene, no erythema exudates or tonsillar enlargement.  Neck: no masses, range of motion within normal limits, no tracheal deviation. No obvious thyroid enlargement.  Abdomen: Nondistended, bowel sounds in all 4 quadrants, soft, she has specific mid epigastric tenderness to palpation, no other tenderness to palpation, no organomegaly, no rebound referred rebound tenderness, no Gonzalez's or McBurney's point tenderness.  Extremities: no clubbing, cyanosis, or edema.    Please note that this dictation was created using voice recognition software. I have made every reasonable attempt to correct obvious errors, but I expect that there are errors of grammar and possibly content that I did not discover before finalizing the note.    Assessment/Plan:  1. Acute gastritis without hemorrhage, unspecified gastritis type  " omeprazole (PRILOSEC) 20 MG delayed-release capsule   2. Nausea  ondansetron (ZOFRAN) 4 MG Tab tablet   Discussed avoidance of spicy foods, nonsteroidal anti-inflammatories, caffeine's    Followup with primary care in the next 7-10 days if not significantly improving, return to the urgent care or go to the emergency room sooner for any worsening of symptoms.

## 2020-09-08 ENCOUNTER — APPOINTMENT (RX ONLY)
Dept: URBAN - NONMETROPOLITAN AREA CLINIC 15 | Facility: CLINIC | Age: 65
Setting detail: DERMATOLOGY
End: 2020-09-08

## 2020-09-08 DIAGNOSIS — L82.1 OTHER SEBORRHEIC KERATOSIS: ICD-10-CM

## 2020-09-08 DIAGNOSIS — D18.0 HEMANGIOMA: ICD-10-CM

## 2020-09-08 DIAGNOSIS — L57.0 ACTINIC KERATOSIS: ICD-10-CM

## 2020-09-08 DIAGNOSIS — L81.4 OTHER MELANIN HYPERPIGMENTATION: ICD-10-CM

## 2020-09-08 DIAGNOSIS — L91.8 OTHER HYPERTROPHIC DISORDERS OF THE SKIN: ICD-10-CM

## 2020-09-08 DIAGNOSIS — D22 MELANOCYTIC NEVI: ICD-10-CM

## 2020-09-08 PROBLEM — D48.5 NEOPLASM OF UNCERTAIN BEHAVIOR OF SKIN: Status: ACTIVE | Noted: 2020-09-08

## 2020-09-08 PROBLEM — D22.61 MELANOCYTIC NEVI OF RIGHT UPPER LIMB, INCLUDING SHOULDER: Status: ACTIVE | Noted: 2020-09-08

## 2020-09-08 PROBLEM — D22.62 MELANOCYTIC NEVI OF LEFT UPPER LIMB, INCLUDING SHOULDER: Status: ACTIVE | Noted: 2020-09-08

## 2020-09-08 PROBLEM — D18.01 HEMANGIOMA OF SKIN AND SUBCUTANEOUS TISSUE: Status: ACTIVE | Noted: 2020-09-08

## 2020-09-08 PROBLEM — D22.71 MELANOCYTIC NEVI OF RIGHT LOWER LIMB, INCLUDING HIP: Status: ACTIVE | Noted: 2020-09-08

## 2020-09-08 PROBLEM — D22.72 MELANOCYTIC NEVI OF LEFT LOWER LIMB, INCLUDING HIP: Status: ACTIVE | Noted: 2020-09-08

## 2020-09-08 PROBLEM — D22.5 MELANOCYTIC NEVI OF TRUNK: Status: ACTIVE | Noted: 2020-09-08

## 2020-09-08 PROCEDURE — 99203 OFFICE O/P NEW LOW 30 MIN: CPT | Mod: 25

## 2020-09-08 PROCEDURE — ? BIOPSY BY SHAVE METHOD

## 2020-09-08 PROCEDURE — ? COUNSELING

## 2020-09-08 PROCEDURE — ? LIQUID NITROGEN

## 2020-09-08 PROCEDURE — 11103 TANGNTL BX SKIN EA SEP/ADDL: CPT

## 2020-09-08 PROCEDURE — 11102 TANGNTL BX SKIN SINGLE LES: CPT

## 2020-09-08 PROCEDURE — 17000 DESTRUCT PREMALG LESION: CPT | Mod: 59

## 2020-09-08 ASSESSMENT — LOCATION DETAILED DESCRIPTION DERM
LOCATION DETAILED: RIGHT RIB CAGE
LOCATION DETAILED: RIGHT ANTERIOR DISTAL UPPER ARM
LOCATION DETAILED: LEFT DISTAL POSTERIOR THIGH
LOCATION DETAILED: MIDDLE STERNUM
LOCATION DETAILED: RIGHT VENTRAL PROXIMAL FOREARM
LOCATION DETAILED: RIGHT ANTERIOR PROXIMAL UPPER ARM
LOCATION DETAILED: RIGHT INFERIOR CENTRAL MALAR CHEEK
LOCATION DETAILED: RIGHT PROXIMAL PRETIBIAL REGION
LOCATION DETAILED: RIGHT ANTECUBITAL SKIN
LOCATION DETAILED: LEFT VENTRAL LATERAL PROXIMAL FOREARM
LOCATION DETAILED: LEFT INFERIOR MEDIAL UPPER BACK
LOCATION DETAILED: XIPHOID
LOCATION DETAILED: LEFT LATERAL PROXIMAL PRETIBIAL REGION
LOCATION DETAILED: LEFT SUPERIOR MEDIAL MIDBACK
LOCATION DETAILED: LEFT ANTERIOR LATERAL PROXIMAL THIGH
LOCATION DETAILED: LEFT PROXIMAL PRETIBIAL REGION
LOCATION DETAILED: LEFT ANTERIOR PROXIMAL UPPER ARM
LOCATION DETAILED: LEFT INFERIOR LATERAL MIDBACK
LOCATION DETAILED: RIGHT DISTAL POSTERIOR THIGH
LOCATION DETAILED: LEFT INFERIOR CENTRAL MALAR CHEEK
LOCATION DETAILED: LEFT ANTERIOR DISTAL UPPER ARM
LOCATION DETAILED: SUBXIPHOID
LOCATION DETAILED: RIGHT MEDIAL SUPERIOR CHEST
LOCATION DETAILED: INFERIOR THORACIC SPINE
LOCATION DETAILED: LEFT POPLITEAL SKIN
LOCATION DETAILED: RIGHT POPLITEAL SKIN
LOCATION DETAILED: LEFT VENTRAL PROXIMAL FOREARM

## 2020-09-08 ASSESSMENT — LOCATION SIMPLE DESCRIPTION DERM
LOCATION SIMPLE: LEFT THIGH
LOCATION SIMPLE: LEFT CHEEK
LOCATION SIMPLE: CHEST
LOCATION SIMPLE: RIGHT PRETIBIAL REGION
LOCATION SIMPLE: LEFT PRETIBIAL REGION
LOCATION SIMPLE: RIGHT POPLITEAL SKIN
LOCATION SIMPLE: RIGHT POSTERIOR THIGH
LOCATION SIMPLE: RIGHT UPPER ARM
LOCATION SIMPLE: LEFT LOWER BACK
LOCATION SIMPLE: LEFT UPPER ARM
LOCATION SIMPLE: LEFT POPLITEAL SKIN
LOCATION SIMPLE: ABDOMEN
LOCATION SIMPLE: RIGHT CHEEK
LOCATION SIMPLE: LEFT UPPER BACK
LOCATION SIMPLE: UPPER BACK
LOCATION SIMPLE: RIGHT FOREARM
LOCATION SIMPLE: LEFT POSTERIOR THIGH
LOCATION SIMPLE: LEFT FOREARM

## 2020-09-08 ASSESSMENT — LOCATION ZONE DERM
LOCATION ZONE: FACE
LOCATION ZONE: TRUNK
LOCATION ZONE: LEG
LOCATION ZONE: ARM

## 2020-09-08 NOTE — PROCEDURE: BIOPSY BY SHAVE METHOD
Detail Level: Detailed
Depth Of Biopsy: dermis
Was A Bandage Applied: Yes
Size Of Lesion In Cm: 0.3
X Size Of Lesion In Cm: 0
Biopsy Type: H and E
Biopsy Method: Personna blade
Anesthesia Type: 1% lidocaine with epinephrine
Anesthesia Volume In Cc: 1
Hemostasis: Electrocautery
Wound Care: Vaseline
Dressing: Band-Aid
Destruction After The Procedure: No
Type Of Destruction Used: Electrodesiccation
Curettage Text: The wound bed was treated with curettage after the biopsy was performed.
Cryotherapy Text: The wound bed was treated with cryotherapy after the biopsy was performed.
Electrodesiccation Text: The wound bed was treated with electrodesiccation after the biopsy was performed.
Electrodesiccation And Curettage Text: The wound bed was treated with electrodesiccation and curettage after the biopsy was performed.
Silver Nitrate Text: The wound bed was treated with silver nitrate after the biopsy was performed.
Lab: [id number]
Lab Facility: 
Consent: Written consent was obtained and risks were reviewed including but not limited to scarring, infection, bleeding, scabbing, incomplete removal, nerve damage and allergy to anesthesia.
Post-Care Instructions: I reviewed with the patient in detail post-care instructions. Patient is to keep the biopsy site dry overnight, and then apply bacitracin/petroleum  twice daily until healed. Patient may apply hydrogen peroxide soaks to remove any crusting.
Notification Instructions: Patient will be notified of biopsy results. However, patient instructed to call the office if not contacted within 2 weeks.
Billing Type: Third-Party Bill
Information: Selecting Yes will display possible errors in your note based on the variables you have selected. This validation is only offered as a suggestion for you. PLEASE NOTE THAT THE VALIDATION TEXT WILL BE REMOVED WHEN YOU FINALIZE YOUR NOTE. IF YOU WANT TO FAX A PRELIMINARY NOTE YOU WILL NEED TO TOGGLE THIS TO 'NO' IF YOU DO NOT WANT IT IN YOUR FAXED NOTE.
Size Of Lesion In Cm: 0.4

## 2022-10-19 ENCOUNTER — APPOINTMENT (RX ONLY)
Dept: URBAN - NONMETROPOLITAN AREA CLINIC 15 | Facility: CLINIC | Age: 67
Setting detail: DERMATOLOGY
End: 2022-10-19

## 2022-10-19 DIAGNOSIS — T07XXXA INSECT BITE, NONVENOMOUS, OF OTHER, MULTIPLE, AND UNSPECIFIED SITES, WITHOUT MENTION OF INFECTION: ICD-10-CM

## 2022-10-19 DIAGNOSIS — D22 MELANOCYTIC NEVI: ICD-10-CM

## 2022-10-19 DIAGNOSIS — L23.1 ALLERGIC CONTACT DERMATITIS DUE TO ADHESIVES: ICD-10-CM

## 2022-10-19 DIAGNOSIS — L81.4 OTHER MELANIN HYPERPIGMENTATION: ICD-10-CM

## 2022-10-19 DIAGNOSIS — L82.1 OTHER SEBORRHEIC KERATOSIS: ICD-10-CM

## 2022-10-19 DIAGNOSIS — D18.0 HEMANGIOMA: ICD-10-CM

## 2022-10-19 DIAGNOSIS — L57.0 ACTINIC KERATOSIS: ICD-10-CM

## 2022-10-19 PROBLEM — D22.72 MELANOCYTIC NEVI OF LEFT LOWER LIMB, INCLUDING HIP: Status: ACTIVE | Noted: 2022-10-19

## 2022-10-19 PROBLEM — D22.61 MELANOCYTIC NEVI OF RIGHT UPPER LIMB, INCLUDING SHOULDER: Status: ACTIVE | Noted: 2022-10-19

## 2022-10-19 PROBLEM — D18.01 HEMANGIOMA OF SKIN AND SUBCUTANEOUS TISSUE: Status: ACTIVE | Noted: 2022-10-19

## 2022-10-19 PROBLEM — D22.71 MELANOCYTIC NEVI OF RIGHT LOWER LIMB, INCLUDING HIP: Status: ACTIVE | Noted: 2022-10-19

## 2022-10-19 PROBLEM — D22.5 MELANOCYTIC NEVI OF TRUNK: Status: ACTIVE | Noted: 2022-10-19

## 2022-10-19 PROBLEM — S20.461A INSECT BITE (NONVENOMOUS) OF RIGHT BACK WALL OF THORAX, INITIAL ENCOUNTER: Status: ACTIVE | Noted: 2022-10-19

## 2022-10-19 PROBLEM — D22.62 MELANOCYTIC NEVI OF LEFT UPPER LIMB, INCLUDING SHOULDER: Status: ACTIVE | Noted: 2022-10-19

## 2022-10-19 PROCEDURE — 99213 OFFICE O/P EST LOW 20 MIN: CPT | Mod: 25

## 2022-10-19 PROCEDURE — ? PATIENT SPECIFIC COUNSELING

## 2022-10-19 PROCEDURE — ? COUNSELING

## 2022-10-19 PROCEDURE — ? LIQUID NITROGEN

## 2022-10-19 PROCEDURE — 17000 DESTRUCT PREMALG LESION: CPT

## 2022-10-19 PROCEDURE — 17003 DESTRUCT PREMALG LES 2-14: CPT

## 2022-10-19 PROCEDURE — ? COUNSELING ALLERGENS

## 2022-10-19 ASSESSMENT — LOCATION DETAILED DESCRIPTION DERM
LOCATION DETAILED: NASAL DORSUM
LOCATION DETAILED: RIGHT PROXIMAL PRETIBIAL REGION
LOCATION DETAILED: RIGHT SUPERIOR UPPER BACK
LOCATION DETAILED: RIGHT INFERIOR UPPER BACK
LOCATION DETAILED: LEFT POPLITEAL SKIN
LOCATION DETAILED: LEFT VENTRAL LATERAL PROXIMAL FOREARM
LOCATION DETAILED: RIGHT PROXIMAL POSTERIOR UPPER ARM
LOCATION DETAILED: LEFT DISTAL DORSAL FOREARM
LOCATION DETAILED: RIGHT LATERAL SUPERIOR CHEST
LOCATION DETAILED: RIGHT SUPERIOR MEDIAL BUCCAL CHEEK
LOCATION DETAILED: LEFT PROXIMAL PRETIBIAL REGION
LOCATION DETAILED: MIDDLE STERNUM
LOCATION DETAILED: LEFT DISTAL POSTERIOR UPPER ARM
LOCATION DETAILED: RIGHT PROXIMAL DORSAL FOREARM
LOCATION DETAILED: LEFT LATERAL ELBOW
LOCATION DETAILED: RIGHT POPLITEAL SKIN
LOCATION DETAILED: RIGHT CENTRAL EYEBROW
LOCATION DETAILED: SUBXIPHOID
LOCATION DETAILED: RIGHT ANTERIOR DISTAL THIGH
LOCATION DETAILED: INFERIOR THORACIC SPINE
LOCATION DETAILED: RIGHT DISTAL POSTERIOR UPPER ARM
LOCATION DETAILED: RIGHT LATERAL UPPER BACK
LOCATION DETAILED: LOWER STERNUM
LOCATION DETAILED: RIGHT VENTRAL PROXIMAL FOREARM

## 2022-10-19 ASSESSMENT — LOCATION SIMPLE DESCRIPTION DERM
LOCATION SIMPLE: RIGHT CHEEK
LOCATION SIMPLE: RIGHT PRETIBIAL REGION
LOCATION SIMPLE: RIGHT POPLITEAL SKIN
LOCATION SIMPLE: NOSE
LOCATION SIMPLE: LEFT ELBOW
LOCATION SIMPLE: LEFT FOREARM
LOCATION SIMPLE: CHEST
LOCATION SIMPLE: LEFT POPLITEAL SKIN
LOCATION SIMPLE: ABDOMEN
LOCATION SIMPLE: RIGHT EYEBROW
LOCATION SIMPLE: RIGHT UPPER BACK
LOCATION SIMPLE: RIGHT POSTERIOR UPPER ARM
LOCATION SIMPLE: RIGHT FOREARM
LOCATION SIMPLE: RIGHT THIGH
LOCATION SIMPLE: LEFT POSTERIOR UPPER ARM
LOCATION SIMPLE: LEFT PRETIBIAL REGION
LOCATION SIMPLE: UPPER BACK

## 2022-10-19 ASSESSMENT — LOCATION ZONE DERM
LOCATION ZONE: NOSE
LOCATION ZONE: FACE
LOCATION ZONE: LEG
LOCATION ZONE: ARM
LOCATION ZONE: TRUNK

## 2022-10-19 NOTE — PROCEDURE: COUNSELING
Detail Level: Zone
Detail Level: Detailed
Patient Specific Counseling (Will Not Stick From Patient To Patient): Recommended triple antibiotic ointment and warm compresses.

## 2022-10-19 NOTE — PROCEDURE: MIPS QUALITY
Quality 226: Preventive Care And Screening: Tobacco Use: Screening And Cessation Intervention: Patient screened for tobacco use and is an ex/non-smoker
Quality 431: Preventive Care And Screening: Unhealthy Alcohol Use - Screening: Patient not identified as an unhealthy alcohol user when screened for unhealthy alcohol use using a systematic screening method
Quality 111:Pneumonia Vaccination Status For Older Adults: Pneumococcal vaccine (PPSV23) administered on or after patient’s 60th birthday and before the end of the measurement period
Detail Level: Detailed
Quality 130: Documentation Of Current Medications In The Medical Record: Current Medications Documented

## 2023-04-21 ENCOUNTER — HOSPITAL ENCOUNTER (OUTPATIENT)
Dept: RADIOLOGY | Facility: MEDICAL CENTER | Age: 68
End: 2023-04-21
Attending: FAMILY MEDICINE
Payer: MEDICARE

## 2023-04-21 DIAGNOSIS — R92.30 DENSE BREASTS: ICD-10-CM

## 2023-04-21 DIAGNOSIS — R92.2 DENSE BREASTS: ICD-10-CM

## 2023-04-21 PROCEDURE — 76641 ULTRASOUND BREAST COMPLETE: CPT

## 2023-08-23 ENCOUNTER — APPOINTMENT (RX ONLY)
Dept: URBAN - NONMETROPOLITAN AREA CLINIC 15 | Facility: CLINIC | Age: 68
Setting detail: DERMATOLOGY
End: 2023-08-23

## 2023-08-23 DIAGNOSIS — D18.0 HEMANGIOMA: ICD-10-CM

## 2023-08-23 DIAGNOSIS — L57.0 ACTINIC KERATOSIS: ICD-10-CM

## 2023-08-23 DIAGNOSIS — L80 VITILIGO: ICD-10-CM

## 2023-08-23 DIAGNOSIS — D22 MELANOCYTIC NEVI: ICD-10-CM

## 2023-08-23 DIAGNOSIS — L81.4 OTHER MELANIN HYPERPIGMENTATION: ICD-10-CM

## 2023-08-23 DIAGNOSIS — L82.1 OTHER SEBORRHEIC KERATOSIS: ICD-10-CM

## 2023-08-23 PROBLEM — D22.61 MELANOCYTIC NEVI OF RIGHT UPPER LIMB, INCLUDING SHOULDER: Status: ACTIVE | Noted: 2023-08-23

## 2023-08-23 PROBLEM — D22.62 MELANOCYTIC NEVI OF LEFT UPPER LIMB, INCLUDING SHOULDER: Status: ACTIVE | Noted: 2023-08-23

## 2023-08-23 PROBLEM — D22.5 MELANOCYTIC NEVI OF TRUNK: Status: ACTIVE | Noted: 2023-08-23

## 2023-08-23 PROBLEM — D18.01 HEMANGIOMA OF SKIN AND SUBCUTANEOUS TISSUE: Status: ACTIVE | Noted: 2023-08-23

## 2023-08-23 PROCEDURE — ? COUNSELING

## 2023-08-23 PROCEDURE — 17000 DESTRUCT PREMALG LESION: CPT

## 2023-08-23 PROCEDURE — 99213 OFFICE O/P EST LOW 20 MIN: CPT | Mod: 25

## 2023-08-23 PROCEDURE — ? LIQUID NITROGEN

## 2023-08-23 ASSESSMENT — LOCATION SIMPLE DESCRIPTION DERM
LOCATION SIMPLE: LEFT CHEEK
LOCATION SIMPLE: RIGHT UPPER ARM
LOCATION SIMPLE: RIGHT FOREARM
LOCATION SIMPLE: ABDOMEN
LOCATION SIMPLE: LOWER BACK
LOCATION SIMPLE: RIGHT UPPER BACK
LOCATION SIMPLE: LEFT UPPER ARM
LOCATION SIMPLE: CHEST
LOCATION SIMPLE: LEFT FOREARM
LOCATION SIMPLE: LEFT ARM

## 2023-08-23 ASSESSMENT — LOCATION DETAILED DESCRIPTION DERM
LOCATION DETAILED: LOWER STERNUM
LOCATION DETAILED: LEFT ULNAR VENTRAL WRIST
LOCATION DETAILED: LEFT VENTRAL DISTAL FOREARM
LOCATION DETAILED: RIGHT MEDIAL UPPER BACK
LOCATION DETAILED: RIGHT ANTECUBITAL SKIN
LOCATION DETAILED: RIGHT VENTRAL PROXIMAL FOREARM
LOCATION DETAILED: SUPERIOR LUMBAR SPINE
LOCATION DETAILED: LEFT CENTRAL MALAR CHEEK
LOCATION DETAILED: RIGHT VENTRAL DISTAL FOREARM
LOCATION DETAILED: RIGHT INFERIOR UPPER BACK
LOCATION DETAILED: PERIUMBILICAL SKIN
LOCATION DETAILED: SUBXIPHOID
LOCATION DETAILED: LEFT VENTRAL PROXIMAL FOREARM
LOCATION DETAILED: RIGHT ANTERIOR DISTAL UPPER ARM
LOCATION DETAILED: RIGHT INFERIOR MEDIAL UPPER BACK
LOCATION DETAILED: LEFT ANTECUBITAL SKIN

## 2023-08-23 ASSESSMENT — LOCATION ZONE DERM
LOCATION ZONE: TRUNK
LOCATION ZONE: FACE
LOCATION ZONE: ARM

## 2023-10-25 ENCOUNTER — APPOINTMENT (RX ONLY)
Dept: URBAN - NONMETROPOLITAN AREA CLINIC 15 | Facility: CLINIC | Age: 68
Setting detail: DERMATOLOGY
End: 2023-10-25

## 2023-10-25 DIAGNOSIS — D22 MELANOCYTIC NEVI: ICD-10-CM

## 2023-10-25 DIAGNOSIS — L81.4 OTHER MELANIN HYPERPIGMENTATION: ICD-10-CM

## 2023-10-25 DIAGNOSIS — L82.1 OTHER SEBORRHEIC KERATOSIS: ICD-10-CM

## 2023-10-25 DIAGNOSIS — D18.0 HEMANGIOMA: ICD-10-CM

## 2023-10-25 PROBLEM — D22.71 MELANOCYTIC NEVI OF RIGHT LOWER LIMB, INCLUDING HIP: Status: ACTIVE | Noted: 2023-10-25

## 2023-10-25 PROBLEM — D22.62 MELANOCYTIC NEVI OF LEFT UPPER LIMB, INCLUDING SHOULDER: Status: ACTIVE | Noted: 2023-10-25

## 2023-10-25 PROBLEM — D22.72 MELANOCYTIC NEVI OF LEFT LOWER LIMB, INCLUDING HIP: Status: ACTIVE | Noted: 2023-10-25

## 2023-10-25 PROBLEM — D18.01 HEMANGIOMA OF SKIN AND SUBCUTANEOUS TISSUE: Status: ACTIVE | Noted: 2023-10-25

## 2023-10-25 PROBLEM — D22.5 MELANOCYTIC NEVI OF TRUNK: Status: ACTIVE | Noted: 2023-10-25

## 2023-10-25 PROBLEM — D22.61 MELANOCYTIC NEVI OF RIGHT UPPER LIMB, INCLUDING SHOULDER: Status: ACTIVE | Noted: 2023-10-25

## 2023-10-25 PROCEDURE — ? COUNSELING

## 2023-10-25 PROCEDURE — 99213 OFFICE O/P EST LOW 20 MIN: CPT

## 2023-10-25 ASSESSMENT — LOCATION DETAILED DESCRIPTION DERM
LOCATION DETAILED: RIGHT PROXIMAL DORSAL FOREARM
LOCATION DETAILED: RIGHT PROXIMAL PRETIBIAL REGION
LOCATION DETAILED: RIGHT SUPERIOR UPPER BACK
LOCATION DETAILED: LEFT PROXIMAL PRETIBIAL REGION
LOCATION DETAILED: RIGHT DISTAL POSTERIOR UPPER ARM
LOCATION DETAILED: RIGHT LATERAL SUPERIOR CHEST
LOCATION DETAILED: RIGHT DISTAL POSTERIOR THIGH
LOCATION DETAILED: RIGHT VENTRAL PROXIMAL FOREARM
LOCATION DETAILED: MIDDLE STERNUM
LOCATION DETAILED: RIGHT POPLITEAL SKIN
LOCATION DETAILED: LEFT DISTAL DORSAL FOREARM
LOCATION DETAILED: INFERIOR THORACIC SPINE
LOCATION DETAILED: LEFT VENTRAL LATERAL PROXIMAL FOREARM
LOCATION DETAILED: RIGHT PROXIMAL POSTERIOR UPPER ARM
LOCATION DETAILED: LEFT INFERIOR FOREHEAD
LOCATION DETAILED: LEFT POPLITEAL SKIN
LOCATION DETAILED: SUBXIPHOID
LOCATION DETAILED: LEFT LATERAL ELBOW
LOCATION DETAILED: RIGHT CRUS OF HELIX
LOCATION DETAILED: RIGHT SUPERIOR MEDIAL BUCCAL CHEEK
LOCATION DETAILED: LEFT DISTAL POSTERIOR THIGH
LOCATION DETAILED: LOWER STERNUM
LOCATION DETAILED: RIGHT ANTERIOR DISTAL THIGH
LOCATION DETAILED: LEFT DISTAL POSTERIOR UPPER ARM
LOCATION DETAILED: RIGHT CENTRAL EYEBROW

## 2023-10-25 ASSESSMENT — LOCATION SIMPLE DESCRIPTION DERM
LOCATION SIMPLE: RIGHT EAR
LOCATION SIMPLE: LEFT POPLITEAL SKIN
LOCATION SIMPLE: LEFT FOREHEAD
LOCATION SIMPLE: RIGHT POPLITEAL SKIN
LOCATION SIMPLE: RIGHT CHEEK
LOCATION SIMPLE: UPPER BACK
LOCATION SIMPLE: RIGHT PRETIBIAL REGION
LOCATION SIMPLE: RIGHT UPPER BACK
LOCATION SIMPLE: RIGHT THIGH
LOCATION SIMPLE: LEFT POSTERIOR THIGH
LOCATION SIMPLE: CHEST
LOCATION SIMPLE: RIGHT EYEBROW
LOCATION SIMPLE: ABDOMEN
LOCATION SIMPLE: LEFT POSTERIOR UPPER ARM
LOCATION SIMPLE: LEFT FOREARM
LOCATION SIMPLE: LEFT ELBOW
LOCATION SIMPLE: LEFT PRETIBIAL REGION
LOCATION SIMPLE: RIGHT FOREARM
LOCATION SIMPLE: RIGHT POSTERIOR UPPER ARM
LOCATION SIMPLE: RIGHT POSTERIOR THIGH

## 2023-10-25 ASSESSMENT — LOCATION ZONE DERM
LOCATION ZONE: EAR
LOCATION ZONE: ARM
LOCATION ZONE: TRUNK
LOCATION ZONE: LEG
LOCATION ZONE: FACE

## 2024-07-25 ENCOUNTER — OFFICE VISIT (OUTPATIENT)
Dept: URGENT CARE | Facility: PHYSICIAN GROUP | Age: 69
End: 2024-07-25
Payer: MEDICARE

## 2024-07-25 VITALS
SYSTOLIC BLOOD PRESSURE: 108 MMHG | HEART RATE: 93 BPM | HEIGHT: 66 IN | DIASTOLIC BLOOD PRESSURE: 62 MMHG | BODY MASS INDEX: 26.2 KG/M2 | WEIGHT: 163 LBS | RESPIRATION RATE: 16 BRPM | TEMPERATURE: 98.2 F | OXYGEN SATURATION: 95 %

## 2024-07-25 DIAGNOSIS — K21.9 GASTROESOPHAGEAL REFLUX DISEASE WITHOUT ESOPHAGITIS: ICD-10-CM

## 2024-07-25 PROCEDURE — 3074F SYST BP LT 130 MM HG: CPT | Performed by: NURSE PRACTITIONER

## 2024-07-25 PROCEDURE — 3078F DIAST BP <80 MM HG: CPT | Performed by: NURSE PRACTITIONER

## 2024-07-25 PROCEDURE — 99203 OFFICE O/P NEW LOW 30 MIN: CPT | Performed by: NURSE PRACTITIONER

## 2024-07-25 RX ORDER — OMEPRAZOLE 20 MG/1
CAPSULE, DELAYED RELEASE ORAL
Qty: 30 CAPSULE | Refills: 0 | Status: SHIPPED | OUTPATIENT
Start: 2024-07-25

## 2024-07-31 ENCOUNTER — TELEPHONE (OUTPATIENT)
Dept: URGENT CARE | Facility: PHYSICIAN GROUP | Age: 69
End: 2024-07-31
Payer: MEDICARE

## 2024-08-01 ENCOUNTER — OFFICE VISIT (OUTPATIENT)
Dept: URGENT CARE | Facility: PHYSICIAN GROUP | Age: 69
End: 2024-08-01
Payer: MEDICARE

## 2024-08-01 VITALS
WEIGHT: 163 LBS | TEMPERATURE: 96.9 F | BODY MASS INDEX: 26.2 KG/M2 | DIASTOLIC BLOOD PRESSURE: 74 MMHG | HEIGHT: 66 IN | OXYGEN SATURATION: 94 % | HEART RATE: 77 BPM | SYSTOLIC BLOOD PRESSURE: 122 MMHG | RESPIRATION RATE: 16 BRPM

## 2024-08-01 DIAGNOSIS — K21.9 GASTROESOPHAGEAL REFLUX DISEASE WITHOUT ESOPHAGITIS: ICD-10-CM

## 2024-08-01 PROCEDURE — 99214 OFFICE O/P EST MOD 30 MIN: CPT | Performed by: NURSE PRACTITIONER

## 2024-08-01 PROCEDURE — 3078F DIAST BP <80 MM HG: CPT | Performed by: NURSE PRACTITIONER

## 2024-08-01 PROCEDURE — 3074F SYST BP LT 130 MM HG: CPT | Performed by: NURSE PRACTITIONER

## 2024-08-01 RX ORDER — OMEPRAZOLE 40 MG/1
40 CAPSULE, DELAYED RELEASE ORAL DAILY
Qty: 30 CAPSULE | Refills: 0 | Status: SHIPPED | OUTPATIENT
Start: 2024-08-01 | End: 2024-08-31

## 2024-08-01 NOTE — PROGRESS NOTES
"Subjective:   Mariana Dumont is a 68 y.o. female who presents for Follow-Up (Pt states the medication for acid reflux isnt working and states she feels worse)    Patient is a 68-year-old female presents clinic today reporting worsening acid reflux symptoms including sharp burning sensation in her throat, esophagus, and epigastric region.  Patient states that she is now having a hard time eating and drinking due to the chronic burning sensation.  She has not had any nausea, vomiting, diarrhea, or fevers.  Patient denies any blood or mucus in her stools.  She was seen on 7/25/2024 and was prescribed omeprazole 20 mg once daily and instructed to take this on an empty stomach.  She states she has been taking this without any symptom improvement.  Patient does have prior cholecystectomy.    Patient denies any alcohol use.  She states she was told in the past that she has a esophageal sphincter that did not close all the way and most likely will need surgery at some point.  Patient's daughter is on the phone during the exam.    Patient to contact the clinic yesterday and came into clinic today requesting referral to Dr. Ojeda's office.  Unfortunately was out of the clinic yesterday and addresses first thing this morning.  Referral was placed to Dr. Ojeda's office as requested.  Patient and daughter are now stating that they would like it to switch to [facility] with Dr. Sykes.     Medications, Allergies, and current problem list reviewed today in Epic.     Objective:     /74   Pulse 77   Temp 36.1 °C (96.9 °F) (Temporal)   Resp 16   Ht 1.676 m (5' 6\")   Wt 73.9 kg (163 lb)   SpO2 94%     Physical Exam  Vitals reviewed.   Constitutional:       General: She is not in acute distress.     Appearance: Normal appearance. She is not ill-appearing or toxic-appearing.   HENT:      Head: Normocephalic.      Nose: Nose normal.      Mouth/Throat:      Mouth: Mucous membranes are moist.   Eyes:      " Extraocular Movements: Extraocular movements intact.      Conjunctiva/sclera: Conjunctivae normal.      Pupils: Pupils are equal, round, and reactive to light.   Cardiovascular:      Rate and Rhythm: Normal rate.   Pulmonary:      Effort: Pulmonary effort is normal.   Abdominal:      General: Abdomen is flat. Bowel sounds are increased. There is no distension.      Palpations: Abdomen is soft.      Tenderness: There is abdominal tenderness in the epigastric area. There is no guarding or rebound.   Musculoskeletal:         General: Normal range of motion.      Cervical back: Normal range of motion and neck supple.   Skin:     General: Skin is warm and dry.   Neurological:      Mental Status: She is alert and oriented to person, place, and time.   Psychiatric:         Mood and Affect: Mood normal.         Behavior: Behavior normal.         Thought Content: Thought content normal.         Judgment: Judgment normal.         Assessment/Plan:     Diagnosis and associated orders:     1. Gastroesophageal reflux disease without esophagitis  Referral to Gastroenterology    omeprazole (PRILOSEC) 40 MG delayed-release capsule         Comments/MDM:     Patient is a 68-year-old female nontoxic-appearing and in no acute distress.  She is presenting clinic today for ongoing GERD symptoms without any improvement with omeprazole.  Patient in clinic today does have increased bowel sounds and epigastric tenderness without rebound or guarding.  Nonacute abdomen.  Patient does not appear dehydrated.  Vital signs all within normal range.  No red flag symptoms.  Discussed treatment options including switching medications, increasing omeprazole, or possibly evening starting sucralfate in case small ulcer may be forming.  With shared decision-making, patient would like to increase omeprazole to 40 mg once daily.  I do feel this is appropriate at this time.  Recommended staying away from triggering foods.  Patient did contact the clinic  yesterday while I was out of the office and requesting referral to Dr. Ojeda's office.  Patient also came into clinic earlier this morning requesting referral in person.  Referral was placed.  Patient represented this afternoon for an appointment and is now requesting a referral to [facility] with Dr. Sykes.  Discussed with patient at length how referral process works and that can take several days to process through Medicare and then an appointment may be set up however this could take several weeks or months depending upon their appointment schedule.  Patient did seem mildly irritated by this wanting a referral today and did not want to wait for Medicare processing.  Discussed with her again as well as her daughter about the referrals process and that if she would like she could may be able to pay out-of-pocket as a self-pay to be seen.  Copy of referral was given to patient.  Encourage patient to keep primary care visit scheduled for the end of August.  Patient was also educated about red flag symptoms and when to present to the emergency department especially if symptoms or not improving within 3 to 5 days or if they acutely worsen.  Patient and daughter were involved with shared decision-making throughout the exam today and verbalizes understanding regards to plan of care, discharge instructions, and follow-up          I have spent 31 minutes on the care of Mariana Dumont.  This includes preparing for the urgent care visit. This time includes review of previous visits/ documents, obtaining HPI, examination and evaluation of patient, medical management, counseling, education and documentation.      Differential diagnosis, natural history, supportive care, and indications for immediate follow-up discussed.    Advised the patient to follow-up with the primary care physician for recheck, reevaluation, and consideration of further management.    I personally reviewed prior external notes and test  results pertinent to today's visit as well as additional imaging and testing completed in clinic today.     Please note that this dictation was created using voice recognition software. I have made a reasonable attempt to correct obvious errors, but I expect that there are errors of grammar and possibly content that I did not discover before finalizing the note.

## 2024-08-01 NOTE — PROGRESS NOTES
Patient called and requested referral to Dr. Ojeda's office.  Asked MA to reach out to ensure that they are covered by her insurance.  Patient showed up in clinic requesting referral to be placed and she did not want to contact her insurance agency.  Referral was placed to Dr. Ojeda's office.

## 2024-08-01 NOTE — TELEPHONE ENCOUNTER
Patient came into clinic requesting referral to Either Dr Lovett or Rajesh Cespedes for her acid reflux. Please advise

## 2024-08-22 ENCOUNTER — HOSPITAL ENCOUNTER (OUTPATIENT)
Dept: RADIOLOGY | Facility: MEDICAL CENTER | Age: 69
End: 2024-08-22
Attending: GENERAL ACUTE CARE HOSPITAL

## 2024-08-22 ENCOUNTER — APPOINTMENT (OUTPATIENT)
Dept: RADIOLOGY | Facility: MEDICAL CENTER | Age: 69
DRG: 917 | End: 2024-08-22
Attending: PSYCHIATRY & NEUROLOGY
Payer: MEDICARE

## 2024-08-22 ENCOUNTER — APPOINTMENT (OUTPATIENT)
Dept: RADIOLOGY | Facility: MEDICAL CENTER | Age: 69
DRG: 917 | End: 2024-08-22
Attending: EMERGENCY MEDICINE
Payer: MEDICARE

## 2024-08-22 ENCOUNTER — HOSPITAL ENCOUNTER (INPATIENT)
Facility: MEDICAL CENTER | Age: 69
LOS: 5 days | DRG: 917 | End: 2024-08-27
Attending: EMERGENCY MEDICINE | Admitting: INTERNAL MEDICINE
Payer: MEDICARE

## 2024-08-22 ENCOUNTER — HOSPITAL ENCOUNTER (OUTPATIENT)
Dept: RADIOLOGY | Facility: MEDICAL CENTER | Age: 69
End: 2024-08-22

## 2024-08-22 DIAGNOSIS — G93.40 ACUTE ENCEPHALOPATHY: ICD-10-CM

## 2024-08-22 DIAGNOSIS — T50.901A ACCIDENTAL OVERDOSE, INITIAL ENCOUNTER: ICD-10-CM

## 2024-08-22 DIAGNOSIS — F41.1 GENERALIZED ANXIETY DISORDER: ICD-10-CM

## 2024-08-22 DIAGNOSIS — Z74.09 IMPAIRED MOBILITY: ICD-10-CM

## 2024-08-22 DIAGNOSIS — J96.00 ACUTE RESPIRATORY FAILURE, UNSPECIFIED WHETHER WITH HYPOXIA OR HYPERCAPNIA (HCC): ICD-10-CM

## 2024-08-22 DIAGNOSIS — I10 PRIMARY HYPERTENSION: ICD-10-CM

## 2024-08-22 PROBLEM — K21.9 GERD (GASTROESOPHAGEAL REFLUX DISEASE): Status: ACTIVE | Noted: 2024-08-22

## 2024-08-22 PROBLEM — E78.5 DYSLIPIDEMIA: Status: ACTIVE | Noted: 2024-08-22

## 2024-08-22 PROBLEM — J96.01 ACUTE RESPIRATORY FAILURE WITH HYPOXIA (HCC): Status: ACTIVE | Noted: 2024-08-22

## 2024-08-22 LAB
ABO + RH BLD: NORMAL
ABO GROUP BLD: NORMAL
ALBUMIN SERPL BCP-MCNC: 3.1 G/DL (ref 3.2–4.9)
ALBUMIN/GLOB SERPL: 1.6 G/DL
ALP SERPL-CCNC: 59 U/L (ref 30–99)
ALT SERPL-CCNC: 19 U/L (ref 2–50)
AMMONIA PLAS-SCNC: 23 UMOL/L (ref 11–45)
AMPHET UR QL SCN: NEGATIVE
ANION GAP SERPL CALC-SCNC: 8 MMOL/L (ref 7–16)
APTT PPP: 25.4 SEC (ref 24.7–36)
ARTERIAL PATENCY WRIST A: ABNORMAL
AST SERPL-CCNC: 18 U/L (ref 12–45)
BARBITURATES UR QL SCN: NEGATIVE
BASE EXCESS BLDA CALC-SCNC: 1 MMOL/L (ref -4–3)
BASOPHILS # BLD AUTO: 0.5 % (ref 0–1.8)
BASOPHILS # BLD: 0.03 K/UL (ref 0–0.12)
BENZODIAZ UR QL SCN: NEGATIVE
BILIRUB SERPL-MCNC: 0.5 MG/DL (ref 0.1–1.5)
BLD GP AB SCN SERPL QL: NORMAL
BODY TEMPERATURE: ABNORMAL DEGREES
BREATHS SETTING VENT: 24
BUN SERPL-MCNC: 18 MG/DL (ref 8–22)
BZE UR QL SCN: NEGATIVE
CALCIUM ALBUM COR SERPL-MCNC: 8.7 MG/DL (ref 8.5–10.5)
CALCIUM SERPL-MCNC: 8 MG/DL (ref 8.5–10.5)
CANNABINOIDS UR QL SCN: NEGATIVE
CHLORIDE SERPL-SCNC: 106 MMOL/L (ref 96–112)
CO2 BLDA-SCNC: 25 MMOL/L (ref 20–33)
CO2 SERPL-SCNC: 22 MMOL/L (ref 20–33)
CREAT SERPL-MCNC: 0.58 MG/DL (ref 0.5–1.4)
DELSYS IDSYS: ABNORMAL
EKG IMPRESSION: NORMAL
EOSINOPHIL # BLD AUTO: 0.11 K/UL (ref 0–0.51)
EOSINOPHIL NFR BLD: 1.8 % (ref 0–6.9)
ERYTHROCYTE [DISTWIDTH] IN BLOOD BY AUTOMATED COUNT: 42.5 FL (ref 35.9–50)
ETHANOL BLD-MCNC: <10.1 MG/DL
FENTANYL UR QL: NEGATIVE
GFR SERPLBLD CREATININE-BSD FMLA CKD-EPI: 97 ML/MIN/1.73 M 2
GLOBULIN SER CALC-MCNC: 1.9 G/DL (ref 1.9–3.5)
GLUCOSE BLD STRIP.AUTO-MCNC: 97 MG/DL (ref 65–99)
GLUCOSE SERPL-MCNC: 96 MG/DL (ref 65–99)
HCO3 BLDA-SCNC: 23.9 MMOL/L (ref 17–25)
HCT VFR BLD AUTO: 33.8 % (ref 37–47)
HGB BLD-MCNC: 11.2 G/DL (ref 12–16)
HOROWITZ INDEX BLDA+IHG-RTO: 300 MM[HG]
IMM GRANULOCYTES # BLD AUTO: 0.01 K/UL (ref 0–0.11)
IMM GRANULOCYTES NFR BLD AUTO: 0.2 % (ref 0–0.9)
INR PPP: 1.1 (ref 0.87–1.13)
LACTATE SERPL-SCNC: 0.9 MMOL/L (ref 0.5–2)
LYMPHOCYTES # BLD AUTO: 0.97 K/UL (ref 1–4.8)
LYMPHOCYTES NFR BLD: 16.2 % (ref 22–41)
MAGNESIUM SERPL-MCNC: 1.8 MG/DL (ref 1.5–2.5)
MCH RBC QN AUTO: 29.6 PG (ref 27–33)
MCHC RBC AUTO-ENTMCNC: 33.1 G/DL (ref 32.2–35.5)
MCV RBC AUTO: 89.4 FL (ref 81.4–97.8)
METHADONE UR QL SCN: NEGATIVE
MODE IMODE: ABNORMAL
MONOCYTES # BLD AUTO: 0.45 K/UL (ref 0–0.85)
MONOCYTES NFR BLD AUTO: 7.5 % (ref 0–13.4)
NEUTROPHILS # BLD AUTO: 4.4 K/UL (ref 1.82–7.42)
NEUTROPHILS NFR BLD: 73.8 % (ref 44–72)
NRBC # BLD AUTO: 0 K/UL
NRBC BLD-RTO: 0 /100 WBC (ref 0–0.2)
O2/TOTAL GAS SETTING VFR VENT: 60 %
OPIATES UR QL SCN: NEGATIVE
OXYCODONE UR QL SCN: NEGATIVE
PCO2 BLDA: 31.4 MMHG (ref 26–37)
PCO2 TEMP ADJ BLDA: 28.8 MMHG (ref 26–37)
PCP UR QL SCN: NEGATIVE
PEEP END EXPIRATORY PRESSURE IPEEP: 8 CMH20
PH BLDA: 7.49 [PH] (ref 7.4–7.5)
PH TEMP ADJ BLDA: 7.52 [PH] (ref 7.4–7.5)
PLATELET # BLD AUTO: 192 K/UL (ref 164–446)
PMV BLD AUTO: 8.9 FL (ref 9–12.9)
PO2 BLDA: 180 MMHG (ref 64–87)
PO2 TEMP ADJ BLDA: 169 MMHG (ref 64–87)
POTASSIUM SERPL-SCNC: 3.5 MMOL/L (ref 3.6–5.5)
PROPOXYPH UR QL SCN: NEGATIVE
PROT SERPL-MCNC: 5 G/DL (ref 6–8.2)
PROTHROMBIN TIME: 14.3 SEC (ref 12–14.6)
RBC # BLD AUTO: 3.78 M/UL (ref 4.2–5.4)
RH BLD: NORMAL
SAO2 % BLDA: 100 % (ref 93–99)
SODIUM SERPL-SCNC: 136 MMOL/L (ref 135–145)
SPECIMEN DRAWN FROM PATIENT: ABNORMAL
TIDAL VOLUME IVT: 350 ML
TRIGL SERPL-MCNC: 55 MG/DL (ref 0–149)
TROPONIN T SERPL-MCNC: 24 NG/L (ref 6–19)
TSH SERPL DL<=0.005 MIU/L-ACNC: 1.65 UIU/ML (ref 0.38–5.33)
WBC # BLD AUTO: 6 K/UL (ref 4.8–10.8)

## 2024-08-22 PROCEDURE — A9270 NON-COVERED ITEM OR SERVICE: HCPCS | Performed by: INTERNAL MEDICINE

## 2024-08-22 PROCEDURE — 700111 HCHG RX REV CODE 636 W/ 250 OVERRIDE (IP): Performed by: INTERNAL MEDICINE

## 2024-08-22 PROCEDURE — 0BH17EZ INSERTION OF ENDOTRACHEAL AIRWAY INTO TRACHEA, VIA NATURAL OR ARTIFICIAL OPENING: ICD-10-PCS | Performed by: EMERGENCY MEDICINE

## 2024-08-22 PROCEDURE — 83605 ASSAY OF LACTIC ACID: CPT

## 2024-08-22 PROCEDURE — A9579 GAD-BASE MR CONTRAST NOS,1ML: HCPCS | Mod: JZ | Performed by: PSYCHIATRY & NEUROLOGY

## 2024-08-22 PROCEDURE — 82077 ASSAY SPEC XCP UR&BREATH IA: CPT

## 2024-08-22 PROCEDURE — 86901 BLOOD TYPING SEROLOGIC RH(D): CPT

## 2024-08-22 PROCEDURE — 700105 HCHG RX REV CODE 258: Performed by: NURSE PRACTITIONER

## 2024-08-22 PROCEDURE — 80053 COMPREHEN METABOLIC PANEL: CPT

## 2024-08-22 PROCEDURE — 95819 EEG AWAKE AND ASLEEP: CPT | Performed by: PSYCHIATRY & NEUROLOGY

## 2024-08-22 PROCEDURE — 4A10X4Z MONITORING OF CENTRAL NERVOUS ELECTRICAL ACTIVITY, EXTERNAL APPROACH: ICD-10-PCS | Performed by: PSYCHIATRY & NEUROLOGY

## 2024-08-22 PROCEDURE — 0042T CT-CEREBRAL PERFUSION ANALYSIS: CPT

## 2024-08-22 PROCEDURE — 86900 BLOOD TYPING SEROLOGIC ABO: CPT

## 2024-08-22 PROCEDURE — 70496 CT ANGIOGRAPHY HEAD: CPT

## 2024-08-22 PROCEDURE — 85730 THROMBOPLASTIN TIME PARTIAL: CPT

## 2024-08-22 PROCEDURE — 302214 INTUBATION BOX: Performed by: EMERGENCY MEDICINE

## 2024-08-22 PROCEDURE — B31RYZZ FLUOROSCOPY OF INTRACRANIAL ARTERIES USING OTHER CONTRAST: ICD-10-PCS | Performed by: RADIOLOGY

## 2024-08-22 PROCEDURE — 93005 ELECTROCARDIOGRAM TRACING: CPT | Performed by: EMERGENCY MEDICINE

## 2024-08-22 PROCEDURE — 700105 HCHG RX REV CODE 258

## 2024-08-22 PROCEDURE — 82803 BLOOD GASES ANY COMBINATION: CPT

## 2024-08-22 PROCEDURE — 84478 ASSAY OF TRIGLYCERIDES: CPT

## 2024-08-22 PROCEDURE — 700117 HCHG RX CONTRAST REV CODE 255: Mod: JZ | Performed by: PSYCHIATRY & NEUROLOGY

## 2024-08-22 PROCEDURE — 70450 CT HEAD/BRAIN W/O DYE: CPT

## 2024-08-22 PROCEDURE — 85610 PROTHROMBIN TIME: CPT

## 2024-08-22 PROCEDURE — 82962 GLUCOSE BLOOD TEST: CPT

## 2024-08-22 PROCEDURE — 770022 HCHG ROOM/CARE - ICU (200)

## 2024-08-22 PROCEDURE — 84484 ASSAY OF TROPONIN QUANT: CPT

## 2024-08-22 PROCEDURE — 71045 X-RAY EXAM CHEST 1 VIEW: CPT

## 2024-08-22 PROCEDURE — 80307 DRUG TEST PRSMV CHEM ANLYZR: CPT

## 2024-08-22 PROCEDURE — 303105 HCHG CATHETER EXTRA

## 2024-08-22 PROCEDURE — 51702 INSERT TEMP BLADDER CATH: CPT

## 2024-08-22 PROCEDURE — 36600 WITHDRAWAL OF ARTERIAL BLOOD: CPT

## 2024-08-22 PROCEDURE — 70498 CT ANGIOGRAPHY NECK: CPT

## 2024-08-22 PROCEDURE — 5A1945Z RESPIRATORY VENTILATION, 24-96 CONSECUTIVE HOURS: ICD-10-PCS | Performed by: INTERNAL MEDICINE

## 2024-08-22 PROCEDURE — 700111 HCHG RX REV CODE 636 W/ 250 OVERRIDE (IP)

## 2024-08-22 PROCEDURE — 99291 CRITICAL CARE FIRST HOUR: CPT

## 2024-08-22 PROCEDURE — 85025 COMPLETE CBC W/AUTO DIFF WBC: CPT

## 2024-08-22 PROCEDURE — 84443 ASSAY THYROID STIM HORMONE: CPT

## 2024-08-22 PROCEDURE — 94002 VENT MGMT INPAT INIT DAY: CPT

## 2024-08-22 PROCEDURE — 94003 VENT MGMT INPAT SUBQ DAY: CPT

## 2024-08-22 PROCEDURE — 700117 HCHG RX CONTRAST REV CODE 255: Performed by: EMERGENCY MEDICINE

## 2024-08-22 PROCEDURE — 70553 MRI BRAIN STEM W/O & W/DYE: CPT

## 2024-08-22 PROCEDURE — 99223 1ST HOSP IP/OBS HIGH 75: CPT | Performed by: PSYCHIATRY & NEUROLOGY

## 2024-08-22 PROCEDURE — 700102 HCHG RX REV CODE 250 W/ 637 OVERRIDE(OP): Performed by: INTERNAL MEDICINE

## 2024-08-22 PROCEDURE — 83735 ASSAY OF MAGNESIUM: CPT

## 2024-08-22 PROCEDURE — 92950 HEART/LUNG RESUSCITATION CPR: CPT

## 2024-08-22 PROCEDURE — 99291 CRITICAL CARE FIRST HOUR: CPT | Performed by: INTERNAL MEDICINE

## 2024-08-22 PROCEDURE — 82140 ASSAY OF AMMONIA: CPT

## 2024-08-22 PROCEDURE — 86850 RBC ANTIBODY SCREEN: CPT

## 2024-08-22 RX ORDER — DEXMEDETOMIDINE HYDROCHLORIDE 4 UG/ML
0-1.5 INJECTION, SOLUTION INTRAVENOUS CONTINUOUS
Status: DISCONTINUED | OUTPATIENT
Start: 2024-08-22 | End: 2024-08-22

## 2024-08-22 RX ORDER — ONDANSETRON 2 MG/ML
4 INJECTION INTRAMUSCULAR; INTRAVENOUS EVERY 4 HOURS PRN
Status: DISCONTINUED | OUTPATIENT
Start: 2024-08-22 | End: 2024-08-27 | Stop reason: HOSPADM

## 2024-08-22 RX ORDER — HYDRALAZINE HYDROCHLORIDE 20 MG/ML
20 INJECTION INTRAMUSCULAR; INTRAVENOUS EVERY 6 HOURS PRN
Status: DISCONTINUED | OUTPATIENT
Start: 2024-08-22 | End: 2024-08-27 | Stop reason: HOSPADM

## 2024-08-22 RX ORDER — SODIUM CHLORIDE, SODIUM LACTATE, POTASSIUM CHLORIDE, CALCIUM CHLORIDE 600; 310; 30; 20 MG/100ML; MG/100ML; MG/100ML; MG/100ML
INJECTION, SOLUTION INTRAVENOUS CONTINUOUS
Status: DISCONTINUED | OUTPATIENT
Start: 2024-08-22 | End: 2024-08-27 | Stop reason: HOSPADM

## 2024-08-22 RX ORDER — AMOXICILLIN 250 MG
2 CAPSULE ORAL EVERY EVENING
Status: DISCONTINUED | OUTPATIENT
Start: 2024-08-22 | End: 2024-08-25

## 2024-08-22 RX ORDER — HYDROMORPHONE HYDROCHLORIDE 1 MG/ML
.5-2 INJECTION, SOLUTION INTRAMUSCULAR; INTRAVENOUS; SUBCUTANEOUS
Status: DISCONTINUED | OUTPATIENT
Start: 2024-08-22 | End: 2024-08-23

## 2024-08-22 RX ORDER — POLYETHYLENE GLYCOL 3350 17 G/17G
1 POWDER, FOR SOLUTION ORAL
Status: DISCONTINUED | OUTPATIENT
Start: 2024-08-22 | End: 2024-08-25

## 2024-08-22 RX ORDER — ONDANSETRON 4 MG/1
4 TABLET, ORALLY DISINTEGRATING ORAL EVERY 4 HOURS PRN
Status: DISCONTINUED | OUTPATIENT
Start: 2024-08-22 | End: 2024-08-25

## 2024-08-22 RX ORDER — SODIUM CHLORIDE, SODIUM LACTATE, POTASSIUM CHLORIDE, AND CALCIUM CHLORIDE .6; .31; .03; .02 G/100ML; G/100ML; G/100ML; G/100ML
500 INJECTION, SOLUTION INTRAVENOUS ONCE
Status: COMPLETED | OUTPATIENT
Start: 2024-08-22 | End: 2024-08-23

## 2024-08-22 RX ORDER — MIDAZOLAM HYDROCHLORIDE 1 MG/ML
5 INJECTION INTRAMUSCULAR; INTRAVENOUS ONCE
Status: COMPLETED | OUTPATIENT
Start: 2024-08-22 | End: 2024-08-22

## 2024-08-22 RX ORDER — SODIUM CHLORIDE 9 MG/ML
1000 INJECTION, SOLUTION INTRAVENOUS ONCE
Status: COMPLETED | OUTPATIENT
Start: 2024-08-22 | End: 2024-08-22

## 2024-08-22 RX ORDER — IPRATROPIUM BROMIDE AND ALBUTEROL SULFATE 2.5; .5 MG/3ML; MG/3ML
3 SOLUTION RESPIRATORY (INHALATION)
Status: DISCONTINUED | OUTPATIENT
Start: 2024-08-22 | End: 2024-08-27 | Stop reason: HOSPADM

## 2024-08-22 RX ORDER — NOREPINEPHRINE BITARTRATE 0.03 MG/ML
0-1 INJECTION, SOLUTION INTRAVENOUS CONTINUOUS
Status: DISCONTINUED | OUTPATIENT
Start: 2024-08-22 | End: 2024-08-24

## 2024-08-22 RX ORDER — ENOXAPARIN SODIUM 100 MG/ML
40 INJECTION SUBCUTANEOUS DAILY
Status: DISCONTINUED | OUTPATIENT
Start: 2024-08-22 | End: 2024-08-22

## 2024-08-22 RX ORDER — LABETALOL HYDROCHLORIDE 5 MG/ML
10-20 INJECTION, SOLUTION INTRAVENOUS EVERY 4 HOURS PRN
Status: DISCONTINUED | OUTPATIENT
Start: 2024-08-22 | End: 2024-08-27 | Stop reason: HOSPADM

## 2024-08-22 RX ORDER — FAMOTIDINE 20 MG/1
20 TABLET, FILM COATED ORAL EVERY 12 HOURS
Status: DISCONTINUED | OUTPATIENT
Start: 2024-08-22 | End: 2024-08-24

## 2024-08-22 RX ORDER — ACETAMINOPHEN 325 MG/1
650 TABLET ORAL EVERY 6 HOURS PRN
Status: DISCONTINUED | OUTPATIENT
Start: 2024-08-22 | End: 2024-08-25

## 2024-08-22 RX ORDER — MIDAZOLAM HYDROCHLORIDE 1 MG/ML
5 INJECTION INTRAMUSCULAR; INTRAVENOUS
Status: DISCONTINUED | OUTPATIENT
Start: 2024-08-22 | End: 2024-08-23

## 2024-08-22 RX ADMIN — IOHEXOL 80 ML: 350 INJECTION, SOLUTION INTRAVENOUS at 14:29

## 2024-08-22 RX ADMIN — MIDAZOLAM HYDROCHLORIDE 5 MG: 1 INJECTION, SOLUTION INTRAMUSCULAR; INTRAVENOUS at 15:34

## 2024-08-22 RX ADMIN — HYDROMORPHONE HYDROCHLORIDE 2 MG: 1 INJECTION, SOLUTION INTRAMUSCULAR; INTRAVENOUS; SUBCUTANEOUS at 21:52

## 2024-08-22 RX ADMIN — PROPOFOL 30 MCG/KG/MIN: 10 INJECTION, EMULSION INTRAVENOUS at 14:52

## 2024-08-22 RX ADMIN — SODIUM CHLORIDE, POTASSIUM CHLORIDE, SODIUM LACTATE AND CALCIUM CHLORIDE: 600; 310; 30; 20 INJECTION, SOLUTION INTRAVENOUS at 22:28

## 2024-08-22 RX ADMIN — PROPOFOL 5 MCG/KG/MIN: 10 INJECTION, EMULSION INTRAVENOUS at 21:47

## 2024-08-22 RX ADMIN — SODIUM CHLORIDE 1000 ML: 9 INJECTION, SOLUTION INTRAVENOUS at 14:38

## 2024-08-22 RX ADMIN — GADOTERIDOL 15 ML: 279.3 INJECTION, SOLUTION INTRAVENOUS at 16:17

## 2024-08-22 RX ADMIN — IOHEXOL 40 ML: 350 INJECTION, SOLUTION INTRAVENOUS at 14:25

## 2024-08-22 RX ADMIN — LABETALOL HYDROCHLORIDE 10 MG: 5 INJECTION, SOLUTION INTRAVENOUS at 17:25

## 2024-08-22 RX ADMIN — SENNOSIDES AND DOCUSATE SODIUM 2 TABLET: 50; 8.6 TABLET ORAL at 17:21

## 2024-08-22 RX ADMIN — SODIUM CHLORIDE, POTASSIUM CHLORIDE, SODIUM LACTATE AND CALCIUM CHLORIDE 500 ML: 600; 310; 30; 20 INJECTION, SOLUTION INTRAVENOUS at 22:24

## 2024-08-22 RX ADMIN — FAMOTIDINE 20 MG: 20 TABLET, FILM COATED ORAL at 17:21

## 2024-08-22 ASSESSMENT — PAIN DESCRIPTION - PAIN TYPE
TYPE: ACUTE PAIN

## 2024-08-22 NOTE — ED NOTES
Medication history reviewed with patients daughter at bedside and patients home pharmacy via phone ([facility] [phone]). Patient unable to participate in interview.  Med rec is complete  Allergies reviewed.     Patient has not had any outpatient antibiotics in the last 30 days.   Anticoagulants: No    Asia Angelo

## 2024-08-22 NOTE — ED NOTES
Pt to RICU via ACLS RN and monitor, ICU tech and RT. VSS remain stable. Daughter Vianney at bedside. Earrings and belongings given to daughter.

## 2024-08-22 NOTE — ED NOTES
1430 Pt to RED 1 from CT. FSBG 97  1433 etomidate 20 mg  1435 succinylcholine 140 mg  1437 pt intubated by paramedic student with supervision by HARSHAL Helton, ETT 7.5 at 22 cm at the teeth, with positive color change, and equal bilateral breath sounds

## 2024-08-22 NOTE — ED NOTES
ERP at bedside to speak with daughter.  Per ERP, wait to take pt to MRI until Dr. Johnson sees the pt.

## 2024-08-22 NOTE — H&P
PULMONARY AND CRITICAL CARE MEDICINE HISTORY AND PHYSICAL EXAMINATION    Date of Admission:  8/22/2024    Admitting Physician:  Roberth Johnson MD    Reason for Admission: Acute encephalopathy    Chief Complaint: Acute encephalopathy    History of Present Illness:    I was kindly asked to see and evaluate Mariana Dumont, a 68 y.o. female for evaluation and management of the above problem.    The entire history is obtained from healthcare providers, the medical record and the daughter at the bedside as this lady cannot provide me with any history.  This lady has a history of primary hypertension, GERD and dyslipidemia.  She was last known well at 1100 hrs. today.  She presented to [facility] with slurred speech and right-sided weakness.  Emergent imaging did not reveal acute pathology and she received tenecteplase at approximately 1300 hrs. for presumed ischemic stroke.  She was sent to [facility] for subspecialty care.  On arrival at [facility], she was barely arousable and required intubation for airway protection.  Repeat imaging after arriving at [facility] reveals no evidence of large vessel occlusion, abnormal brain perfusion or acute abnormalities on head CT.  Her daughter tells me that she has not been ill recently.    Medications Prior to Admission:    No current facility-administered medications on file prior to encounter.     Current Outpatient Medications on File Prior to Encounter   Medication Sig Dispense Refill    omeprazole (PRILOSEC) 40 MG delayed-release capsule Take 1 Capsule by mouth every day for 30 days. 30 Capsule 0    losartan (COZAAR) 50 MG Tab Take 50 mg by mouth every day. FOR 90 DAYS      simvastatin (ZOCOR) 40 MG Tab Take 40 mg by mouth at bedtime.      Multiple Vitamins-Minerals (CENTRUM PO) Take 1 Tablet by mouth every day.         Current Medications:      Current Facility-Administered Medications:     midazolam (Versed) injection 5 mg, 5 mg, Intravenous, Once PRN, Gricelda L.  BERNABE Diaz    acetaminophen (Tylenol) tablet 650 mg, 650 mg, Oral, Q6HRS PRN, Roberth Johnson M.D.    senna-docusate (Pericolace Or Senokot S) 8.6-50 MG per tablet 2 Tablet, 2 Tablet, Oral, Q EVENING **AND** polyethylene glycol/lytes (Miralax) Packet 1 Packet, 1 Packet, Oral, QDAY PRN, Roberth Johnson M.D.    ondansetron (Zofran) syringe/vial injection 4 mg, 4 mg, Intravenous, Q4HRS PRN, Roberth Johnson M.D.    ondansetron (Zofran ODT) dispertab 4 mg, 4 mg, Oral, Q4HRS PRN, Roberth Johnson M.D.    Respiratory Therapy Consult, , Nebulization, Continuous RT, Roberth Johnson M.D.    famotidine (Pepcid) tablet 20 mg, 20 mg, Enteral Tube, Q12HRS **OR** famotidine (Pepcid) injection 20 mg, 20 mg, Intravenous, Q12HRS, Roberth Johnson M.D.    MD Alert...ICU Electrolyte Replacement per Pharmacy, , Other, PHARMACY TO DOSE, Roberth Johnson M.D.    lidocaine (Xylocaine) 1 % injection 2 mL, 2 mL, Tracheal Tube, Q30 MIN PRN, Roberth Johnson M.D.    ipratropium-albuterol (DUONEB) nebulizer solution, 3 mL, Nebulization, Q2HRS PRN (RT), Roberth Johnson M.D.    HYDROmorphone (Dilaudid) injection 0.5-2 mg, 0.5-2 mg, Intravenous, Q HOUR PRN, Roberth Johnson M.D.    propofol (DIPRIVAN) injection, 0-80 mcg/kg/min (Ideal), Intravenous, Continuous **AND** Triglycerides Starting now and then Every 3 Days, , , Every 3 Days (0300), Roberth Johnson M.D.    hydrALAZINE (Apresoline) injection 20 mg, 20 mg, Intravenous, Q6HRS PRN, Roberth Johnson M.D.    labetalol (Normodyne/Trandate) injection 10-20 mg, 10-20 mg, Intravenous, Q4HRS PRN, Roberth Johnson M.D.    Current Outpatient Medications:     omeprazole (PRILOSEC) 40 MG delayed-release capsule, Take 1 Capsule by mouth every day for 30 days., Disp: 30 Capsule, Rfl: 0    losartan (COZAAR) 50 MG Tab, Take 50 mg by mouth every day. FOR 90 DAYS, Disp: , Rfl:     simvastatin (ZOCOR) 40 MG Tab, Take 40 mg  by mouth at bedtime., Disp: , Rfl:     Multiple Vitamins-Minerals (CENTRUM PO), Take 1 Tablet by mouth every day., Disp: , Rfl:     Allergies:    Sulfa drugs    Past Surgical History:    Past Surgical History:   Procedure Laterality Date    ABDOMINAL HYSTERECTOMY TOTAL      CHOLECYSTECTOMY      PRIMARY C SECTION         Past Medical History:    Past Medical History:   Diagnosis Date    Cancer (HCC)        Social History:    Social History     Socioeconomic History    Marital status:      Spouse name: Not on file    Number of children: Not on file    Years of education: Not on file    Highest education level: Not on file   Occupational History    Not on file   Tobacco Use    Smoking status: Former    Smokeless tobacco: Never   Substance and Sexual Activity    Alcohol use: No    Drug use: No    Sexual activity: Not on file   Other Topics Concern    Not on file   Social History Narrative    Not on file     Social Determinants of Health     Financial Resource Strain: Not on file   Food Insecurity: Not on file   Transportation Needs: Not on file   Physical Activity: Not on file   Stress: Not on file   Social Connections: Not on file   Intimate Partner Violence: Not on file   Housing Stability: Not on file       Family History:    No family history on file.    Review of System:    Review of Systems   Unable to perform ROS: Acuity of condition       Physical Examination:    BP (!) 145/75   Pulse 94   Temp 36.6 °C (97.8 °F) (Temporal)   Resp (!) 24   Wt 73.9 kg (163 lb)   SpO2 100%   BMI 26.31 kg/m²   Physical Exam  Constitutional:       Appearance: She is not diaphoretic.   HENT:      Head: Normocephalic and atraumatic.   Eyes:      Pupils: Pupils are equal, round, and reactive to light.   Cardiovascular:      Comments: Sinus rhythm  Pulmonary:      Breath sounds: No wheezing or rales.   Abdominal:      General: There is no distension.      Tenderness: There is no abdominal tenderness.   Musculoskeletal:       Cervical back: Neck supple. No rigidity.      Right lower leg: No edema.      Left lower leg: No edema.   Skin:     General: Skin is warm.      Capillary Refill: Capillary refill takes less than 2 seconds.   Neurological:      Comments: Her pupils are 4 mm, equal and brisk.  She has been sedated.         Laboratory Data:        Recent Labs     08/22/24  1438   WBC 6.0   RBC 3.78*   HEMOGLOBIN 11.2*   HEMATOCRIT 33.8*   MCV 89.4   MCH 29.6   MCHC 33.1   RDW 42.5   PLATELETCT 192   MPV 8.9*     Recent Labs     08/22/24  1438   SODIUM 136   POTASSIUM 3.5*   CHLORIDE 106   CO2 22   GLUCOSE 96   BUN 18   CREATININE 0.58   CALCIUM 8.0*                   Imaging:    I personally viewed all the available CXR and CT scan images as well as reviewed the radiology interpretation reports.    CT-CTA NECK WITH & W/O-POST PROCESSING   Final Result         1. Atherosclerosis without significant stenosis, aneurysm, or occlusion.   2. Right thyroid nodule may be further assessed with ultrasound.      CT-CTA HEAD WITH & W/O-POST PROCESS   Final Result      CT angiogram of the [place] within normal limits.      CT-CEREBRAL PERFUSION ANALYSIS   Final Result      1. Cerebral blood flow less than 30% possibly representing completed infarct = 0 mL. Based on distribution of this finding, this is unlikely to represent artifact.      2. T Max more than 6 seconds possibly representing combination of completed infarct and ischemia = 0 mL. Based on the distribution of this finding, this is unlikely to represent artifact.      3. Mismatched volume possibly representing ischemic brain/penumbra= 0 mL      4.  Please note that this cerebral perfusion study and report is Quantitative and targets supratentorial (cerebral) perfusion for evaluation of large vessel territory acute ischemia/infarction. For example, lacunar infarcts, and brainstem/posterior fossa    ischemia/infarction are not evaluated on this study.  Data acquisition is subject  "to artifacts which can yield non-anatomically plausible perfusion maps which may be due to motion, bolus timing, signal to noise ratio, or other technical factors.    Perfusion map abnormalities which show non-anatomic distributions are likely artifact.   This study is not \"stand-alone\" and should only be utilized for diagnosis, management/treatment in correlation with CT, CTA, and/or MRI and clinical factors.         CT-HEAD W/O   Final Result      No acute intracranial abnormality.                        DX-CHEST-PORTABLE (1 VIEW)    (Results Pending)   MR-BRAIN-WITH & W/O    (Results Pending)       Assessment and Plan:    * Acute encephalopathy- (present on admission)  Assessment & Plan  Presented to outside hospital with presumed acute ischemic stroke and received tenecteplase on or about 1300 hrs. on 8/22/2024  CTA without evidence of large vessel occlusion  CTP without evidence of ischemia  CT head without acute pathology  No meningeal signs on physical examination, afebrile, no leukocytosis  STAT MRI and EEG  Urine drug screen    Acute respiratory failure with hypoxia (HCC)  Assessment & Plan  Intubated 8/22 for airway protection  ABCDEF bundle  SAT/SBT as appropriate  Mobility level 1 for now    Primary hypertension  Assessment & Plan  Goal SBP less than 160    Dyslipidemia  Assessment & Plan  Check lipid panel    GERD (gastroesophageal reflux disease)  Assessment & Plan  Famotidine        High risk of deterioration and worsening vital organ dysfunction and death without the above critical care interventions.    I have assessed and reassessed her respiratory status with ventilator adjustments, airway mechanics, ventilator waveforms, blood pressure, hemodynamics, cardiovascular status and her neurologic status.  She is at increased risk for worsening respiratory and CNS system dysfunction.    The patient is critically ill.  Critical care time = 35 minutes in directly providing and coordinating critical care " and extensive data review.  No time overlap and excludes procedures.    Discussed with ER physician, RN, RT    Roberth Johnson MD  Pulmonary and Critical Care Medicine

## 2024-08-22 NOTE — PROGRESS NOTES
4 Eyes Skin Assessment Completed by Jose RN and TRISTAN Marcus.    Head WDL  Ears Jaundice  Nose WDL  Mouth WDL  Neck WDL  Breast/Chest WDL  Shoulder Blades Redness and Blanching  Spine Redness and Blanching  (R) Arm/Elbow/Hand WDL bruising upper arm  (L) Arm/Elbow/Hand WDL  Abdomen WDL  Groin WDL  Scrotum/Coccyx/Buttocks WDL  (R) Leg WDL  (L) Leg WDL  (R) Heel/Foot/Toe Redness and Blanching  (L) Heel/Foot/Toe Redness and Blanching          Devices In Places Blood Pressure Cuff, Pulse Ox, Mann, and SCD's      Interventions In Place Sacral Mepilex, Pillows, Q2 Turns, Low Air Loss Mattress, Heels Loaded W/Pillows, and Pressure Redistribution Mattress    Possible Skin Injury No    Pictures Uploaded Into Epic N/A  Wound Consult Placed N/A  RN Wound Prevention Protocol Ordered No

## 2024-08-22 NOTE — ASSESSMENT & PLAN NOTE
Presented to outside hospital with presumed acute ischemic stroke and received tenecteplase on or about 1300 hrs. on 8/22/2024  CTA without evidence of large vessel occlusion  CTP without evidence of ischemia  CT head without acute pathology  No meningeal signs on physical examination, afebrile, no leukocytosis  STAT MRI shows no structural abnormalities, no evidence of PRES or posterior circulation stroke.  EEG shows encephalopathy pattern, no evidence of seizures  - Patient has confirmed that she took muscle relaxants for her lower leg spasms prior to the admission.  - Encephalopathy is resolving, attributed to CNS depression secondary to muscle relaxants.  - There is a component of delirium with visual hallucinations.  - Focus on resetting sleep-wake cycle  - Seroquel at night (check QTc prior to initiating medication.)

## 2024-08-22 NOTE — ED PROVIDER NOTES
ED Provider Note    CHIEF COMPLAINT  Chief Complaint   Patient presents with    Possible Stroke     BIB CF from [facility] for slurred speech and right sided weakness that started at 1100 today. Patient received TNK at 1253 and was transferred to [facility]. Patient GCS 6 on arrival. Patient taken to scanner with Neurology bedside.      EXTERNAL RECORDS REVIEWED  Labs and studies from [facility] prior to arrival show unremarkable CMP, troponin, and BNP, a WBC of 5, and normal CTA of the head and neck.  Patient last seen by her primary care provider at the beginning of August for GERD symptoms.    HPI/ROS  LIMITATION TO HISTORY   Select: Patient is unresponsive.  OUTSIDE HISTORIAN(S):  EMS was  present and provided all history.    Mariana Dumont is a 68 y.o. female who presents to the Emergency Department via EMS as a code stroke transfer from [facility] last known well at 11:00 AM this morning. Per EMS, the patient had witnessed slurred speech at the onset of her symptoms. She has been having right sided numbness and tingling recently and was supposed to see her PCP for it. Head CT at the outside facility was negative and the patient received TNK prior to transfer.  Unclear when symptoms progressed however patient has been unresponsive with EMS for the entire transport.  She has had some contracture like movements in all 4 extremities per EMS.  The patient is unresponsive and not answering any questions.    PAST MEDICAL HISTORY  Past Medical History:   Diagnosis Date    Cancer (HCC)         SURGICAL HISTORY  Past Surgical History:   Procedure Laterality Date    ABDOMINAL HYSTERECTOMY TOTAL      CHOLECYSTECTOMY      PRIMARY C SECTION          FAMILY HISTORY  None noted.     SOCIAL HISTORY   reports that she has quit smoking. She has never used smokeless tobacco. She reports that she does not drink alcohol and does not use drugs.    CURRENT MEDICATIONS  Previous Medications    LOSARTAN (COZAAR) 50 MG TAB     Take 50 mg by mouth every day. FOR 90 DAYS    MULTIPLE VITAMINS-MINERALS (CENTRUM PO)    Take  by mouth.    OMEPRAZOLE (PRILOSEC) 40 MG DELAYED-RELEASE CAPSULE    Take 1 Capsule by mouth every day for 30 days.    SIMVASTATIN (ZOCOR) 40 MG TAB           ALLERGIES  Sulfa drugs    PHYSICAL EXAM  BP (!) 145/75   Pulse 94   Temp 36.6 °C (97.8 °F) (Temporal)   Resp (!) 24   Wt 73.9 kg (163 lb)   SpO2 100%      Constitutional: Unresponsive but breathing comfortably, appears to be protecting her airway.  HENT: Normocephalic, Atraumatic. Bilateral external ears normal. Nose normal.  Moist mucous membranes.  Oropharynx clear.  Eyes: Pupils are equal and reactive. Conjunctiva normal.   Neck: Supple, full range of motion  Heart: Regular rate and rhythm.  No murmurs.    Lungs: No respiratory distress, normal work of breathing. Lungs clear to auscultation bilaterally.  Abdomen Soft, no distention.  No tenderness to palpation.  Musculoskeletal: Atraumatic. No obvious deformities noted.  No lower extremity edema.  Skin: Warm, Dry.  No erythema, No rash.   Neurologic: Unresponsive, Having possibly some posturing movements in all 4 extremities with sternal rub, but no spontaneous movement. Not opening eyes or following commands.  Psychiatric: Unable to assess.     DIAGNOSTIC STUDIES / PROCEDURES    EKG  I have independently interpreted this EKG  Results for orders placed or performed during the hospital encounter of 24   EKG (NOW)   Result Value Ref Range    Report       [facility] Emergency Dept.    Test Date:  2024  Pt Name:    LAQUITA LOFTON               Department: ER  MRN:        [id number]                      Room:        01  Gender:     Female                       Technician: [id number]  :        [id number]                   Requested By:BALDEV LE  Order #:    [id number]                    Reading MD:    Measurements  Intervals                                Axis  Rate:       101                           P:          50  DE:         197                          QRS:        39  QRSD:       107                          T:          10  QT:         367  QTc:        476    Interpretive Statements  Sinus tachycardia  Minimal ST depression, anterolateral leads  No previous ECG available for comparison         LABS  Labs Reviewed   CBC WITH DIFFERENTIAL - Abnormal; Notable for the following components:       Result Value    RBC 3.78 (*)     Hemoglobin 11.2 (*)     Hematocrit 33.8 (*)     MPV 8.9 (*)     Neutrophils-Polys 73.80 (*)     Lymphocytes 16.20 (*)     Lymphs (Absolute) 0.97 (*)     All other components within normal limits   COMP METABOLIC PANEL - Abnormal; Notable for the following components:    Potassium 3.5 (*)     Calcium 8.0 (*)     Albumin 3.1 (*)     Total Protein 5.0 (*)     All other components within normal limits   TROPONIN - Abnormal; Notable for the following components:    Troponin T 24 (*)     All other components within normal limits   POCT ARTERIAL BLOOD GAS DEVICE RESULTS - Abnormal; Notable for the following components:    Po2 180 (*)     S02 100 (*)     Ph Temp Dionisio 7.521 (*)     Po2 Temp Cor 169 (*)     All other components within normal limits   PROTHROMBIN TIME   APTT   COD (ADULT)   URINE DRUG SCREEN   AMMONIA   TRIGLYCERIDE   DIAGNOSTIC ALCOHOL   LACTIC ACID   MAGNESIUM   TSH WITH REFLEX TO FT4   ESTIMATED GFR   ABO RH CONFIRM   TRIGLYCERIDE   URINE DRUG SCREEN   POCT GLUCOSE DEVICE RESULTS         RADIOLOGY  I have independently interpreted the diagnostic imaging associated with this visit and am waiting the final reading from the radiologist.   My preliminary interpretation is as follows: no intracranial hemorrhage    Radiologist interpretation:  MR-BRAIN-WITH & W/O   Final Result      MRI OF THE BRAIN WITHOUT AND WITH CONTRAST WITHIN NORMAL LIMITS.      DX-CHEST-PORTABLE (1 VIEW)   Final Result      Mild bibasilar opacities, possibly atelectasis.      CT-CTA NECK  "WITH & W/O-POST PROCESSING   Final Result         1. Atherosclerosis without significant stenosis, aneurysm, or occlusion.   2. Right thyroid nodule may be further assessed with ultrasound.      CT-CTA HEAD WITH & W/O-POST PROCESS   Final Result      CT angiogram of the [place] within normal limits.      CT-CEREBRAL PERFUSION ANALYSIS   Final Result      1. Cerebral blood flow less than 30% possibly representing completed infarct = 0 mL. Based on distribution of this finding, this is unlikely to represent artifact.      2. T Max more than 6 seconds possibly representing combination of completed infarct and ischemia = 0 mL. Based on the distribution of this finding, this is unlikely to represent artifact.      3. Mismatched volume possibly representing ischemic brain/penumbra= 0 mL      4.  Please note that this cerebral perfusion study and report is Quantitative and targets supratentorial (cerebral) perfusion for evaluation of large vessel territory acute ischemia/infarction. For example, lacunar infarcts, and brainstem/posterior fossa    ischemia/infarction are not evaluated on this study.  Data acquisition is subject to artifacts which can yield non-anatomically plausible perfusion maps which may be due to motion, bolus timing, signal to noise ratio, or other technical factors.    Perfusion map abnormalities which show non-anatomic distributions are likely artifact.   This study is not \"stand-alone\" and should only be utilized for diagnosis, management/treatment in correlation with CT, CTA, and/or MRI and clinical factors.         CT-HEAD W/O   Final Result      No acute intracranial abnormality.                            Intubation Procedure Note    Indication: airway compromise    Consent: Unable to be obtained due to patient's condition.    Medications Used: etomidate intravenously and succinycholine intravenously    Procedure: The patient was placed in the appropriate position.  Cricoid pressure was " not required. glidescope was used. Intubation was performed by video laryngoscopy, using a glidescope a 7.5 cuffed endotracheal tube.  The cuff was then inflated and the tube was secured appropriately at a distance of 22 cm to the dental ridge.  Initial confirmation of placement included bilateral breath sounds and an end tidal CO2 detector.  A chest x-ray to verify correct placement of the tube showed appropriate tube position.    The patient tolerated the procedure well.     Complications:   None       COURSE & MEDICAL DECISION MAKING    2:14 PM - Patient seen and examined at the charge desk. The patient is a 68 year old female who presents to the ED as a code stroke transfer from [facility] known well at 11:00 AM this morning. Dr. Mancuso (vascular neuro) was present at this time. Discussed plan of care, including immediate head CT, labs, and intubation. Ordered for DX-Chest, CT-Head w/o, CT-Cerebral perfusion analysis, CT-CTA Head w/ and w/o post process, CT-CTA neck w/ and w/o post processing, EKG, CBC w/ diff, CMP, prothrombin time, APTT, COD, and troponin to evaluate her symptoms. I consulted with pharmacy at this time.     STROKE:   Time seen 2:14 PM (Time)   Patient received TNK prior to treatment  NIH stroke scale is very difficult to obtain as the patient is completely obtunded at this time.    2:40 PM - The patient was successfully intubated by paramedic student with my supervision.    2:51 PM - Patient will be treated with NS 1 L IV.     ASSESSMENT, COURSE AND PLAN  Care Narrative: Relatively healthy female who presents as a transfer from outside hospital initially due to concern for stroke as she had acute onset of slurred speech and right-sided weakness.  She received TNK prior to transfer.  On arrival here, the patient is completely obtunded and I am unable to get an exam.  Vital signs are reassuring.  Imaging was performed immediately and there is no evidence of intracranial hemorrhage.   CTA of the head and neck are normal as well as a normal perfusion study.  The patient continued to be completely unresponsive therefore was intubated on return from CT scan.  Labs were notable for a mild troponin of 24, EKG does not show any evidence of obvious ischemia or arrhythmia.  Patient has a normal white count and lactate without concern for infectious process.  There is no renal dysfunction or electrolyte abnormality.  Drug and alcohol screening are negative in addition to ammonia levels.  She has mild anemia.    Discussed with Dr. Ness with neurology.  Unclear etiology for her acute mental status change.  Will plan for a stat MRI of the brain as well as EEG.  Patient will be admitted to the ICU.    3:28 PM - I discussed the patient's case and the above findings with Dr. Johnson (hospitalist) who agrees to admit the patient.  Patient will be treated with Versed 5 mg IV.    ADDITIONAL PROBLEM LIST  Problem #1: Acute encephalopathy -initially thought to be due to a stroke therefore patient received TNK prior to transfer.  Imaging here is largely unremarkable, needs MRI and EEG, neurology on board    Problem #2: Acute respiratory failure -requiring intubation in the ED    Problem #3: Elevated troponin -continue to monitor    DISPOSITION AND DISCUSSIONS  I have discussed management of the patient with the following physicians and DOC's:  Dr. Mancuso (vascular neuro),   Dr. Johnson (hospitalist)    Discussion of management with other hospitals or appropriate source(s): Pharmacy   and RT        CRITICAL CARE  The very real possibilty of a deterioration of this patient's condition required the highest level of my preparedness for sudden, emergent intervention.  I provided critical care services, which included medication orders, frequent reevaluations of the patient's condition and response to treatment, ordering and reviewing test results, and discussing the case with various consultants.  The critical  care time associated with the care of the patient was 39 minutes. Review chart for interventions. This time is exclusive of any other billable procedures.       DISPOSITION:  Patient will be hospitalized by Dr. Johnson (critical care) in critical condition.     FINAL DIAGNOSIS  1. Acute encephalopathy    2. Acute respiratory failure, unspecified whether with hypoxia or hypercapnia (HCC)        The note accurately reflects work and decisions made by me.  Carly Helton M.D.  8/22/2024  6:05 PM     Pacheco VELASQUEZ (Qi), am scribing for, and in the presence of, Carly Helton M.D..    Electronically signed by: Pacheco Lopez (Qi), 8/22/2024    Carly VELASQUEZ M.D. personally performed the services described in this documentation, as scribed by Pacheco Lopez in my presence, and it is both accurate and complete.

## 2024-08-23 ENCOUNTER — APPOINTMENT (OUTPATIENT)
Dept: RADIOLOGY | Facility: MEDICAL CENTER | Age: 69
DRG: 917 | End: 2024-08-23
Attending: INTERNAL MEDICINE
Payer: MEDICARE

## 2024-08-23 LAB
ANION GAP SERPL CALC-SCNC: 8 MMOL/L (ref 7–16)
BASE EXCESS BLDA CALC-SCNC: -1 MMOL/L (ref -4–3)
BASOPHILS # BLD AUTO: 0.5 % (ref 0–1.8)
BASOPHILS # BLD: 0.04 K/UL (ref 0–0.12)
BODY TEMPERATURE: ABNORMAL DEGREES
BREATHS SETTING VENT: 20
BUN SERPL-MCNC: 15 MG/DL (ref 8–22)
CALCIUM SERPL-MCNC: 8.7 MG/DL (ref 8.5–10.5)
CHLORIDE SERPL-SCNC: 107 MMOL/L (ref 96–112)
CHOLEST SERPL-MCNC: 105 MG/DL (ref 100–199)
CO2 BLDA-SCNC: 24 MMOL/L (ref 20–33)
CO2 SERPL-SCNC: 23 MMOL/L (ref 20–33)
CREAT SERPL-MCNC: 0.62 MG/DL (ref 0.5–1.4)
DELSYS IDSYS: ABNORMAL
EOSINOPHIL # BLD AUTO: 0.1 K/UL (ref 0–0.51)
EOSINOPHIL NFR BLD: 1.3 % (ref 0–6.9)
ERYTHROCYTE [DISTWIDTH] IN BLOOD BY AUTOMATED COUNT: 43 FL (ref 35.9–50)
GFR SERPLBLD CREATININE-BSD FMLA CKD-EPI: 96 ML/MIN/1.73 M 2
GLUCOSE SERPL-MCNC: 102 MG/DL (ref 65–99)
HCO3 BLDA-SCNC: 22.7 MMOL/L (ref 17–25)
HCT VFR BLD AUTO: 37.5 % (ref 37–47)
HDLC SERPL-MCNC: 39 MG/DL
HGB BLD-MCNC: 12.6 G/DL (ref 12–16)
HOROWITZ INDEX BLDA+IHG-RTO: 200 MM[HG]
IMM GRANULOCYTES # BLD AUTO: 0.02 K/UL (ref 0–0.11)
IMM GRANULOCYTES NFR BLD AUTO: 0.3 % (ref 0–0.9)
LACTATE BLD-SCNC: 0.9 MMOL/L (ref 0.5–2)
LDLC SERPL CALC-MCNC: ABNORMAL MG/DL
LYMPHOCYTES # BLD AUTO: 0.59 K/UL (ref 1–4.8)
LYMPHOCYTES NFR BLD: 7.9 % (ref 22–41)
MAGNESIUM SERPL-MCNC: 1.8 MG/DL (ref 1.5–2.5)
MCH RBC QN AUTO: 30.1 PG (ref 27–33)
MCHC RBC AUTO-ENTMCNC: 33.6 G/DL (ref 32.2–35.5)
MCV RBC AUTO: 89.5 FL (ref 81.4–97.8)
MODE IMODE: ABNORMAL
MONOCYTES # BLD AUTO: 0.52 K/UL (ref 0–0.85)
MONOCYTES NFR BLD AUTO: 7 % (ref 0–13.4)
NEUTROPHILS # BLD AUTO: 6.2 K/UL (ref 1.82–7.42)
NEUTROPHILS NFR BLD: 83 % (ref 44–72)
NRBC # BLD AUTO: 0 K/UL
NRBC BLD-RTO: 0 /100 WBC (ref 0–0.2)
O2/TOTAL GAS SETTING VFR VENT: 30 %
PCO2 BLDA: 33.5 MMHG (ref 26–37)
PCO2 TEMP ADJ BLDA: 33.5 MMHG (ref 26–37)
PEEP END EXPIRATORY PRESSURE IPEEP: 8 CMH20
PH BLDA: 7.44 [PH] (ref 7.4–7.5)
PH TEMP ADJ BLDA: 7.44 [PH] (ref 7.4–7.5)
PHOSPHATE SERPL-MCNC: 3.7 MG/DL (ref 2.5–4.5)
PLATELET # BLD AUTO: 178 K/UL (ref 164–446)
PMV BLD AUTO: 9.3 FL (ref 9–12.9)
PO2 BLDA: 60 MMHG (ref 64–87)
PO2 TEMP ADJ BLDA: 60 MMHG (ref 64–87)
POTASSIUM SERPL-SCNC: 3.5 MMOL/L (ref 3.6–5.5)
RBC # BLD AUTO: 4.19 M/UL (ref 4.2–5.4)
SAO2 % BLDA: 92 % (ref 93–99)
SODIUM SERPL-SCNC: 138 MMOL/L (ref 135–145)
SPECIMEN DRAWN FROM PATIENT: ABNORMAL
TIDAL VOLUME IVT: 350 ML
TRIGL SERPL-MCNC: 550 MG/DL (ref 0–149)
WBC # BLD AUTO: 7.5 K/UL (ref 4.8–10.8)

## 2024-08-23 PROCEDURE — 700105 HCHG RX REV CODE 258: Performed by: INTERNAL MEDICINE

## 2024-08-23 PROCEDURE — 71045 X-RAY EXAM CHEST 1 VIEW: CPT

## 2024-08-23 PROCEDURE — 83605 ASSAY OF LACTIC ACID: CPT

## 2024-08-23 PROCEDURE — 700111 HCHG RX REV CODE 636 W/ 250 OVERRIDE (IP): Mod: JZ | Performed by: INTERNAL MEDICINE

## 2024-08-23 PROCEDURE — 84100 ASSAY OF PHOSPHORUS: CPT

## 2024-08-23 PROCEDURE — 80061 LIPID PANEL: CPT

## 2024-08-23 PROCEDURE — 700111 HCHG RX REV CODE 636 W/ 250 OVERRIDE (IP): Performed by: INTERNAL MEDICINE

## 2024-08-23 PROCEDURE — 94003 VENT MGMT INPAT SUBQ DAY: CPT

## 2024-08-23 PROCEDURE — 82803 BLOOD GASES ANY COMBINATION: CPT

## 2024-08-23 PROCEDURE — 770022 HCHG ROOM/CARE - ICU (200)

## 2024-08-23 PROCEDURE — 700101 HCHG RX REV CODE 250: Performed by: INTERNAL MEDICINE

## 2024-08-23 PROCEDURE — 700105 HCHG RX REV CODE 258: Performed by: NURSE PRACTITIONER

## 2024-08-23 PROCEDURE — 83735 ASSAY OF MAGNESIUM: CPT

## 2024-08-23 PROCEDURE — 700111 HCHG RX REV CODE 636 W/ 250 OVERRIDE (IP): Performed by: NURSE PRACTITIONER

## 2024-08-23 PROCEDURE — 94150 VITAL CAPACITY TEST: CPT

## 2024-08-23 PROCEDURE — 80048 BASIC METABOLIC PNL TOTAL CA: CPT

## 2024-08-23 PROCEDURE — 99291 CRITICAL CARE FIRST HOUR: CPT | Performed by: INTERNAL MEDICINE

## 2024-08-23 PROCEDURE — 36600 WITHDRAWAL OF ARTERIAL BLOOD: CPT

## 2024-08-23 PROCEDURE — 94799 UNLISTED PULMONARY SVC/PX: CPT

## 2024-08-23 PROCEDURE — 85025 COMPLETE CBC W/AUTO DIFF WBC: CPT

## 2024-08-23 RX ORDER — HYDROMORPHONE HYDROCHLORIDE 1 MG/ML
0.5 INJECTION, SOLUTION INTRAMUSCULAR; INTRAVENOUS; SUBCUTANEOUS
Status: DISCONTINUED | OUTPATIENT
Start: 2024-08-23 | End: 2024-08-27 | Stop reason: HOSPADM

## 2024-08-23 RX ORDER — DEXMEDETOMIDINE HYDROCHLORIDE 4 UG/ML
.1-1.5 INJECTION, SOLUTION INTRAVENOUS CONTINUOUS
Status: DISCONTINUED | OUTPATIENT
Start: 2024-08-23 | End: 2024-08-26

## 2024-08-23 RX ORDER — POTASSIUM CHLORIDE 7.45 MG/ML
10 INJECTION INTRAVENOUS
Status: DISPENSED | OUTPATIENT
Start: 2024-08-23 | End: 2024-08-23

## 2024-08-23 RX ORDER — POTASSIUM CHLORIDE 7.45 MG/ML
10 INJECTION INTRAVENOUS ONCE
Status: COMPLETED | OUTPATIENT
Start: 2024-08-23 | End: 2024-08-23

## 2024-08-23 RX ORDER — MAGNESIUM SULFATE HEPTAHYDRATE 40 MG/ML
2 INJECTION, SOLUTION INTRAVENOUS ONCE
Status: COMPLETED | OUTPATIENT
Start: 2024-08-23 | End: 2024-08-23

## 2024-08-23 RX ADMIN — DEXMEDETOMIDINE 0.2 MCG/KG/HR: 100 INJECTION, SOLUTION INTRAVENOUS at 10:32

## 2024-08-23 RX ADMIN — PROPOFOL 5 MCG/KG/MIN: 10 INJECTION, EMULSION INTRAVENOUS at 03:03

## 2024-08-23 RX ADMIN — LIDOCAINE HYDROCHLORIDE 2 ML: 10 INJECTION, SOLUTION INFILTRATION; PERINEURAL at 10:15

## 2024-08-23 RX ADMIN — MAGNESIUM SULFATE HEPTAHYDRATE 2 G: 2 INJECTION, SOLUTION INTRAVENOUS at 07:38

## 2024-08-23 RX ADMIN — FAMOTIDINE 20 MG: 10 INJECTION, SOLUTION INTRAVENOUS at 17:07

## 2024-08-23 RX ADMIN — FAMOTIDINE 20 MG: 10 INJECTION, SOLUTION INTRAVENOUS at 06:00

## 2024-08-23 RX ADMIN — POTASSIUM CHLORIDE 10 MEQ: 7.46 INJECTION, SOLUTION INTRAVENOUS at 11:47

## 2024-08-23 RX ADMIN — SODIUM CHLORIDE, POTASSIUM CHLORIDE, SODIUM LACTATE AND CALCIUM CHLORIDE: 600; 310; 30; 20 INJECTION, SOLUTION INTRAVENOUS at 10:56

## 2024-08-23 RX ADMIN — POTASSIUM CHLORIDE 10 MEQ: 7.46 INJECTION, SOLUTION INTRAVENOUS at 10:20

## 2024-08-23 RX ADMIN — HYDROMORPHONE HYDROCHLORIDE 0.5 MG: 1 INJECTION, SOLUTION INTRAMUSCULAR; INTRAVENOUS; SUBCUTANEOUS at 02:55

## 2024-08-23 RX ADMIN — LABETALOL HYDROCHLORIDE 10 MG: 5 INJECTION, SOLUTION INTRAVENOUS at 22:09

## 2024-08-23 RX ADMIN — SODIUM CHLORIDE, POTASSIUM CHLORIDE, SODIUM LACTATE AND CALCIUM CHLORIDE: 600; 310; 30; 20 INJECTION, SOLUTION INTRAVENOUS at 23:46

## 2024-08-23 ASSESSMENT — PAIN DESCRIPTION - PAIN TYPE
TYPE: ACUTE PAIN

## 2024-08-23 ASSESSMENT — COGNITIVE AND FUNCTIONAL STATUS - GENERAL
DRESSING REGULAR LOWER BODY CLOTHING: TOTAL
TOILETING: TOTAL
MOVING FROM LYING ON BACK TO SITTING ON SIDE OF FLAT BED: TOTAL
STANDING UP FROM CHAIR USING ARMS: TOTAL
MOVING TO AND FROM BED TO CHAIR: TOTAL
CLIMB 3 TO 5 STEPS WITH RAILING: TOTAL
DAILY ACTIVITIY SCORE: 6
WALKING IN HOSPITAL ROOM: TOTAL
SUGGESTED CMS G CODE MODIFIER MOBILITY: CN
MOBILITY SCORE: 6
DRESSING REGULAR UPPER BODY CLOTHING: TOTAL
HELP NEEDED FOR BATHING: TOTAL
SUGGESTED CMS G CODE MODIFIER DAILY ACTIVITY: CN
PERSONAL GROOMING: TOTAL
EATING MEALS: TOTAL
TURNING FROM BACK TO SIDE WHILE IN FLAT BAD: TOTAL

## 2024-08-23 ASSESSMENT — COPD QUESTIONNAIRES
HAVE YOU SMOKED AT LEAST 100 CIGARETTES IN YOUR ENTIRE LIFE: YES
COPD SCREENING SCORE: 4
DURING THE PAST 4 WEEKS HOW MUCH DID YOU FEEL SHORT OF BREATH: NONE/LITTLE OF THE TIME
DO YOU EVER COUGH UP ANY MUCUS OR PHLEGM?: NO/ONLY WITH OCCASIONAL COLDS OR INFECTIONS

## 2024-08-23 ASSESSMENT — PULMONARY FUNCTION TESTS: FVC: 1.6

## 2024-08-23 NOTE — CARE PLAN
Problem: Ventilation  Goal: Ability to achieve and maintain unassisted ventilation or tolerate decreased levels of ventilator support  Description: Target End Date:  4 days     Document on Vent flowsheet    1.  Support and monitor invasive and noninvasive mechanical ventilation  2.  Monitor ventilator weaning response  3.  Perform ventilator associated pneumonia prevention interventions  4.  Manage ventilation therapy by monitoring diagnostic test results  Outcome: Progressing     Ventilator Daily Summary    Vent Day #1  Airway: 7.5 @ 22    Ventilator settings: APV/CMV 20, 350, +8, 40%  Weaning trials: None  Respiratory Procedures: None    Plan: Continue current ventilator settings and wean mechanical ventilation as tolerated per physician orders.

## 2024-08-23 NOTE — PROGRESS NOTES
Critical Care Progress Note    Date of admission  8/22/2024    Chief Complaint  68 y.o. female admitted 8/22/2024. The entire history is obtained from healthcare providers, the medical record and the daughter at the bedside as this lady cannot provide me with any history.  This lady has a history of primary hypertension, GERD and dyslipidemia.  She was last known well at 1100 hrs. today.  She presented to [facility] with slurred speech and right-sided weakness.  Emergent imaging did not reveal acute pathology and she received tenecteplase at approximately 1300 hrs. for presumed ischemic stroke.  She was sent to [facility] for subspecialty care.  On arrival at [facility], she was barely arousable and required intubation for airway protection.  Repeat imaging after arriving at [facility] reveals no evidence of large vessel occlusion, abnormal brain perfusion or acute abnormalities on head CT.  Her daughter tells me that she has not been ill recently.     Hospital Course  8/23- No seizures seen on cEEG since admission over night.    Interval Problem Update  Reviewed last 24 hour events:              - No acute events overnight              - Tm: Afebrile              - HR: 70-90s              - SBP:  90-1 20s              - Neuro: RASS -4, sedation off. Opens eyes to voice, MEW spont not following              - GI: NPO              - UOP: 1.1 L              - Mann: Yes              - Lines: 2X PIV              - PPx:H2, No DVT               - CXR (personally reviewed):               - Antibiotic Day   - SAT/SBT   - Mobility    Review of Systems  Review of Systems   Unable to perform ROS: Intubated        Vital Signs for last 24 hours   Temp:  [36.6 °C (97.8 °F)-37.8 °C (100 °F)] 37.5 °C (99.5 °F)  Pulse:  [] 84  Resp:  [16-28] 16  BP: ()/(34-76) 122/66  SpO2:  [94 %-100 %] 99 %    Hemodynamic parameters for last 24 hours       Respiratory Information for the last 24 hours  Vent Mode: Spont  Rate (breaths/min):  20  Vt Target (mL): 350  PEEP/CPAP: 8  P Support: 5  MAP: 10  Control VTE (exp VT): 345    Physical Exam   Physical Exam  Constitutional:       General: She is not in acute distress.     Comments: Intubated   HENT:      Mouth/Throat:      Mouth: Mucous membranes are moist.   Eyes:      Pupils: Pupils are equal, round, and reactive to light.   Cardiovascular:      Rate and Rhythm: Normal rate and regular rhythm.      Heart sounds: No murmur heard.     No friction rub. No gallop.   Pulmonary:      Effort: Pulmonary effort is normal. No respiratory distress.      Breath sounds: No wheezing or rales.   Abdominal:      Palpations: Abdomen is soft.   Musculoskeletal:      Cervical back: Neck supple.      Right lower leg: No edema.      Left lower leg: No edema.   Skin:     General: Skin is warm and dry.      Capillary Refill: Capillary refill takes less than 2 seconds.   Neurological:      Cranial Nerves: No cranial nerve deficit.      Comments: Sedated, RASS -1, agitated when sedation held.  Extremities well   Psychiatric:      Comments: Unable to assess         Medications  Current Facility-Administered Medications   Medication Dose Route Frequency Provider Last Rate Last Admin    HYDROmorphone (Dilaudid) injection 0.5 mg  0.5 mg Intravenous Q HOUR PRN Tete GAINES Latona   0.5 mg at 08/23/24 0255    dexmedetomidine (Precedex) 400 mcg/100mL infusion  0.1-1.5 mcg/kg/hr (Ideal) Intravenous Continuous Solitario Hoskins M.D. 3 mL/hr at 08/23/24 1032 0.2 mcg/kg/hr at 08/23/24 1032    potassium chloride (KCL) ivpb 10 mEq  10 mEq Intravenous Once Solitario Hoskins M.D.   Stopped at 08/23/24 1247    acetaminophen (Tylenol) tablet 650 mg  650 mg Enteral Tube Q6HRS PRN Roberth Johnson M.D.        senna-docusate (Pericolace Or Senokot S) 8.6-50 MG per tablet 2 Tablet  2 Tablet Enteral Tube Q EVENING Roberth Johnson M.D.   2 Tablet at 08/22/24 1721    And    polyethylene glycol/lytes (Miralax) Packet 1 Packet  1 Packet  Enteral Tube QDAY PRN Roberth Johnson M.D.        ondansetron (Zofran) syringe/vial injection 4 mg  4 mg Intravenous Q4HRS PRN Roberth Johnson M.D.        ondansetron (Zofran ODT) dispertab 4 mg  4 mg Enteral Tube Q4HRS PRN Roberth Johnson M.D.        Respiratory Therapy Consult   Nebulization Continuous RT Roberth Johnson M.D.        famotidine (Pepcid) tablet 20 mg  20 mg Enteral Tube Q12HRS Roberth Johnson M.D.   20 mg at 08/22/24 1721    Or    famotidine (Pepcid) injection 20 mg  20 mg Intravenous Q12HRS Roberth Johnson M.D.   20 mg at 08/23/24 0600    MD Alert...ICU Electrolyte Replacement per Pharmacy   Other PHARMACY TO DOSE Roberth Johnson M.D.        lidocaine (Xylocaine) 1 % injection 2 mL  2 mL Tracheal Tube Q30 MIN PRN Roberth Johnson M.D.   2 mL at 08/23/24 1015    ipratropium-albuterol (DUONEB) nebulizer solution  3 mL Nebulization Q2HRS PRN (RT) Roberth Johnson M.D.        propofol (DIPRIVAN) injection  0-80 mcg/kg/min (Ideal) Intravenous Continuous Roberth Johnson M.D.   Stopped at 08/23/24 0827    hydrALAZINE (Apresoline) injection 20 mg  20 mg Intravenous Q6HRS PRN Roberth Johnson M.D.        labetalol (Normodyne/Trandate) injection 10-20 mg  10-20 mg Intravenous Q4HRS PRN Roberth Johnson M.D.   10 mg at 08/22/24 1725    lactated ringers infusion   Intravenous Continuous Tete L. Latona 75 mL/hr at 08/23/24 1056 New Bag at 08/23/24 1056    norepinephrine (Levophed) 8 mg in 250 mL NS infusion (premix)  0-1 mcg/kg/min (Ideal) Intravenous Continuous Tete L. Latona   Held at 08/22/24 2230       Fluids    Intake/Output Summary (Last 24 hours) at 8/23/2024 1230  Last data filed at 8/23/2024 1200  Gross per 24 hour   Intake 1228.03 ml   Output 1325 ml   Net -96.97 ml       Laboratory  Recent Labs     08/22/24  1640 08/23/24  0402   ISTATAPH 7.490 7.439   ISTATAPCO2 31.4 33.5   ISTATAPO2 180* 60*   ISTATATCO2 25 24    PPMELYT8QOR 100* 92*   ISTATARTHCO3 23.9 22.7   ISTATARTBE 1 -1   ISTATTEMP 35.0 C 37.0 C   ISTATFIO2 60 30   ISTATSPEC Arterial Arterial   ISTATAPHTC 7.521* 7.439   MQDRRBON9GG 169* 60*         Recent Labs     08/22/24  1438 08/23/24  0438   SODIUM 136 138   POTASSIUM 3.5* 3.5*   CHLORIDE 106 107   CO2 22 23   BUN 18 15   CREATININE 0.58 0.62   MAGNESIUM 1.8 1.8   PHOSPHORUS  --  3.7   CALCIUM 8.0* 8.7     Recent Labs     08/22/24  1438 08/23/24  0438   ALTSGPT 19  --    ASTSGOT 18  --    ALKPHOSPHAT 59  --    TBILIRUBIN 0.5  --    GLUCOSE 96 102*     Recent Labs     08/22/24 1438 08/23/24  0438   WBC 6.0 7.5   NEUTSPOLYS 73.80* 83.00*   LYMPHOCYTES 16.20* 7.90*   MONOCYTES 7.50 7.00   EOSINOPHILS 1.80 1.30   BASOPHILS 0.50 0.50   ASTSGOT 18  --    ALTSGPT 19  --    ALKPHOSPHAT 59  --    TBILIRUBIN 0.5  --      Recent Labs     08/22/24  1438 08/23/24  0438   RBC 3.78* 4.19*   HEMOGLOBIN 11.2* 12.6   HEMATOCRIT 33.8* 37.5   PLATELETCT 192 178   PROTHROMBTM 14.3  --    APTT 25.4  --    INR 1.10  --        Imaging  MRI:   Reviewed    Assessment/Plan  * Acute encephalopathy- (present on admission)  Assessment & Plan  Presented to outside hospital with presumed acute ischemic stroke and received tenecteplase on or about 1300 hrs. on 8/22/2024  CTA without evidence of large vessel occlusion  CTP without evidence of ischemia  CT head without acute pathology  No meningeal signs on physical examination, afebrile, no leukocytosis  STAT MRI shows no structural abnormalities, no evidence of PRES or posterior circulation stroke.  EEG shows encephalopathy pattern, no evidence of seizures      Dyslipidemia  Assessment & Plan  Check lipid panel    GERD (gastroesophageal reflux disease)  Assessment & Plan  Famotidine    Primary hypertension  Assessment & Plan  Goal SBP less than 160    Acute respiratory failure with hypoxia (HCC)  Assessment & Plan  Intubated 8/22 for airway protection  Lung protective ventilation  strategies  Titrate ventilator prescription to optimize oxygenation, ventilation, and acid base balance.  Daily ABC trials         VTE:  Contraindicated  Ulcer: H2 Antagonist  Lines: None    I have performed a physical exam and reviewed and updated ROS and Plan today (8/23/2024). In review of yesterday's note (8/22/2024), there are no changes except as documented above.     Discussed patient condition and risk of morbidity and/or mortality with Family, RN, RT, Charge nurse / hot rounds, and neurology      The patient remains critically ill.  I have assessed and reassessed the respiratory status and made ventilator adjustments based upon arterial blood gas analysis, ventilator waveforms and airway mechanics.  I have assessed and reassessed the blood pressure, hemodynamics, cardiovascular status. This patient remains at high risk for worsening cardiopulmonary dysfunction and death without the above critical care interventions.    Critical care time = 45 minutes in directly providing and coordinating critical care and extensive data review.  No time overlap and excludes procedures.

## 2024-08-23 NOTE — PROGRESS NOTES
On 8/23/24, I collected one gold wedding band/ring, and placed it in [facility] safekeeping. Envelope # [id number].

## 2024-08-23 NOTE — PROGRESS NOTES
Patient's  requesting gold ring/wedding band that patient presented to hospital with.  Informed patient's  (Manjinder) that we have band currently in [facility] safekeeping.   With Gracie Geiger RN, ring obtained from safekeeping.  Given to patient's  at bedside (Manjinder).      Obtained from envelope #[id number]

## 2024-08-23 NOTE — CARE PLAN
The patient is Watcher - Medium risk of patient condition declining or worsening    Shift Goals  Clinical Goals: sbp<160, stable neuro & hemodynamics  Patient Goals: bryanna  Family Goals: bryanna    Progress made toward(s) clinical / shift goals:    Problem: Safety - Medical Restraint  Goal: Remains free of injury from restraints (Restraint for Interference with Medical Device)  Outcome: Progressing     Problem: Skin Integrity  Goal: Skin integrity is maintained or improved  Outcome: Progressing     Problem: Fall Risk  Goal: Patient will remain free from falls  Outcome: Progressing     Problem: Pain - Standard  Goal: Alleviation of pain or a reduction in pain to the patient’s comfort goal  Outcome: Progressing       Patient is not progressing towards the following goals:      Problem: Knowledge Deficit - Standard  Goal: Patient and family/care givers will demonstrate understanding of plan of care, disease process/condition, diagnostic tests and medications  Outcome: Not Progressing     Problem: Safety - Medical Restraint  Goal: Free from restraint(s) (Restraint for Interference with Medical Device)  Outcome: Not Progressing

## 2024-08-23 NOTE — CARE PLAN
Problem: Safety - Medical Restraint  Goal: Remains free of injury from restraints (Restraint for Interference with Medical Device)  8/23/2024 1514 by Shannon Yu R.N.  Outcome: Met  8/23/2024 1133 by Shannon Yu R.N.  Outcome: Progressing  Flowsheets (Taken 8/23/2024 1133)  Addressed this shift: Remains free of injury from restraints (restraint for interference with medical device):   Determine that other, less restrictive measures have been tried or would not be effective before applying the restraint   Evaluate the patient's condition at the time of restraint application   Inform patient/family regarding the reason for restraint   Every 2 hours: Monitor safety, psychosocial status, comfort, nutrition and hydration  Goal: Free from restraint(s) (Restraint for Interference with Medical Device)  Outcome: Met

## 2024-08-23 NOTE — CARE PLAN
Problem: Ventilation  Goal: Ability to achieve and maintain unassisted ventilation or tolerate decreased levels of ventilator support  Description: Target End Date:  4 days     Document on Vent flowsheet    1.  Support and monitor invasive and noninvasive mechanical ventilation  2.  Monitor ventilator weaning response  3.  Perform ventilator associated pneumonia prevention interventions  4.  Manage ventilation therapy by monitoring diagnostic test results  Outcome: Progressing                        Ventilator Daily Summary     Vent Day #2  Airway: 7.5 @ 22     Ventilator settings: APV/CMV 20, 350, +8, 40%    Respiratory Procedures: None     Plan: Continue current ventilator settings and wean mechanical ventilation as tolerated per physician orders.

## 2024-08-23 NOTE — PROGRESS NOTES
Patient extubated to 3L NC @ 1440; family present at bedside and tearful.  Dr. Hoskins present, educated patient on reduce trying to speak for 1 hour post extubation.    Family educated and low stimulus environment maintained at this time.

## 2024-08-23 NOTE — CARE PLAN
The patient is Watcher - Medium risk of patient condition declining or worsening    Shift Goals  Clinical Goals: SBP <160; neuro checks q1hr then per unit protocol; hemodynamic stability  Patient Goals: bryanna  Family Goals: updates    Progress made toward(s) clinical / shift goals:    Problem: Knowledge Deficit - Standard  Goal: Patient and family/care givers will demonstrate understanding of plan of care, disease process/condition, diagnostic tests and medications  Outcome: Progressing     Problem: Safety - Medical Restraint  Goal: Remains free of injury from restraints (Restraint for Interference with Medical Device)  Outcome: Progressing  Flowsheets (Taken 8/23/2024 1133)  Addressed this shift: Remains free of injury from restraints (restraint for interference with medical device):   Determine that other, less restrictive measures have been tried or would not be effective before applying the restraint   Evaluate the patient's condition at the time of restraint application   Inform patient/family regarding the reason for restraint   Every 2 hours: Monitor safety, psychosocial status, comfort, nutrition and hydration     Problem: Skin Integrity  Goal: Skin integrity is maintained or improved  Outcome: Progressing     Problem: Fall Risk  Goal: Patient will remain free from falls  Outcome: Progressing     Problem: Pain - Standard  Goal: Alleviation of pain or a reduction in pain to the patient’s comfort goal  Outcome: Progressing       Patient is not progressing towards the following goals:

## 2024-08-23 NOTE — PROCEDURES
VIDEO ELECTROENCEPHALOGRAM REPORT      Referring provider: Dr. Hoskins    DOS:  08/22/24  (total recording of 24 minutes).     INDICATION:  Mariana Dumont 68 y.o. female presenting with AMS    CURRENT ANTIEPILEPTIC REGIMEN: none     TECHNIQUE: 30 channel video electroencephalogram (EEG) was performed in accordance with the international 10-20 system. The study was reviewed in bipolar and referential montages. The recording examined the patient during obtunded state.     DESCRIPTION OF THE RECORD:  The background consisted of diffuse, symmetric theta range slowing up to 6-7 Hz upon stimulation.No well-formed posterior dominant rhythm or anterior-posterior gradient was seen. Without stimulation, there was attenuation of the background theta activity and increased fast frequency. No well formed sleep patterns seen.    ACTIVATION PROCEDURE:  hyperventilation was not performed    Intermittent Photic stimulation was performed in a stepwise fashion from 1 to 30 Hz and elicited no photic driving response.     ICTAL AND/OR INTERICTAL FINDINGS:   No focal or generalized epileptiform activity noted. No regional slowing was seen during this routine study.  No clinical events or seizures were reported or recorded during the study.     EKG: sampling of the EKG recording demonstrated sinus rhythm.     EVENTS: none     INTERPRETATION:    This is an abnormal video EEG recording in the obtunded state.  1) The diffuse background slowing is consistent with moderate encephalopathy.    2) No epileptiform discharges or seizures were seen. This does not preclude a diagnosis of epilepsy.     Josef Turner MD  Diplomate in Neurology&Epilepsy  Office: [phone]  Fax: [phone]

## 2024-08-23 NOTE — RESPIRATORY CARE
Extubation    Cuff leak noted yes  Stridor present no      Patient toleration tolerated well   RCP Complete? Will assess in 1 hour  Events/Summary/Plan: Parameters obtained MD at bedside pt extubated per order (08/23/24 0084)

## 2024-08-24 ENCOUNTER — APPOINTMENT (OUTPATIENT)
Dept: RADIOLOGY | Facility: MEDICAL CENTER | Age: 69
DRG: 917 | End: 2024-08-24
Attending: INTERNAL MEDICINE
Payer: MEDICARE

## 2024-08-24 PROBLEM — E04.1 THYROID NODULE: Status: ACTIVE | Noted: 2024-08-24

## 2024-08-24 LAB
ANION GAP SERPL CALC-SCNC: 12 MMOL/L (ref 7–16)
ANION GAP SERPL CALC-SCNC: 13 MMOL/L (ref 7–16)
BASOPHILS # BLD AUTO: 0.2 % (ref 0–1.8)
BASOPHILS # BLD: 0.02 K/UL (ref 0–0.12)
BUN SERPL-MCNC: 8 MG/DL (ref 8–22)
BUN SERPL-MCNC: 8 MG/DL (ref 8–22)
CALCIUM SERPL-MCNC: 9.3 MG/DL (ref 8.5–10.5)
CALCIUM SERPL-MCNC: 9.7 MG/DL (ref 8.5–10.5)
CHLORIDE SERPL-SCNC: 105 MMOL/L (ref 96–112)
CHLORIDE SERPL-SCNC: 106 MMOL/L (ref 96–112)
CO2 SERPL-SCNC: 20 MMOL/L (ref 20–33)
CO2 SERPL-SCNC: 22 MMOL/L (ref 20–33)
CREAT SERPL-MCNC: 0.61 MG/DL (ref 0.5–1.4)
CREAT SERPL-MCNC: 0.65 MG/DL (ref 0.5–1.4)
EKG IMPRESSION: NORMAL
EOSINOPHIL # BLD AUTO: 0.15 K/UL (ref 0–0.51)
EOSINOPHIL NFR BLD: 1.5 % (ref 0–6.9)
ERYTHROCYTE [DISTWIDTH] IN BLOOD BY AUTOMATED COUNT: 41.9 FL (ref 35.9–50)
GFR SERPLBLD CREATININE-BSD FMLA CKD-EPI: 95 ML/MIN/1.73 M 2
GFR SERPLBLD CREATININE-BSD FMLA CKD-EPI: 96 ML/MIN/1.73 M 2
GLUCOSE SERPL-MCNC: 101 MG/DL (ref 65–99)
GLUCOSE SERPL-MCNC: 120 MG/DL (ref 65–99)
HCT VFR BLD AUTO: 39.7 % (ref 37–47)
HGB BLD-MCNC: 13 G/DL (ref 12–16)
IMM GRANULOCYTES # BLD AUTO: 0.02 K/UL (ref 0–0.11)
IMM GRANULOCYTES NFR BLD AUTO: 0.2 % (ref 0–0.9)
LYMPHOCYTES # BLD AUTO: 0.61 K/UL (ref 1–4.8)
LYMPHOCYTES NFR BLD: 6.3 % (ref 22–41)
MAGNESIUM SERPL-MCNC: 2.1 MG/DL (ref 1.5–2.5)
MAGNESIUM SERPL-MCNC: 2.2 MG/DL (ref 1.5–2.5)
MCH RBC QN AUTO: 28.9 PG (ref 27–33)
MCHC RBC AUTO-ENTMCNC: 32.7 G/DL (ref 32.2–35.5)
MCV RBC AUTO: 88.2 FL (ref 81.4–97.8)
MONOCYTES # BLD AUTO: 0.45 K/UL (ref 0–0.85)
MONOCYTES NFR BLD AUTO: 4.6 % (ref 0–13.4)
NEUTROPHILS # BLD AUTO: 8.48 K/UL (ref 1.82–7.42)
NEUTROPHILS NFR BLD: 87.2 % (ref 44–72)
NRBC # BLD AUTO: 0 K/UL
NRBC BLD-RTO: 0 /100 WBC (ref 0–0.2)
PHOSPHATE SERPL-MCNC: 2.3 MG/DL (ref 2.5–4.5)
PHOSPHATE SERPL-MCNC: 2.6 MG/DL (ref 2.5–4.5)
PLATELET # BLD AUTO: 176 K/UL (ref 164–446)
PMV BLD AUTO: 9.1 FL (ref 9–12.9)
POTASSIUM SERPL-SCNC: 3.6 MMOL/L (ref 3.6–5.5)
POTASSIUM SERPL-SCNC: 3.9 MMOL/L (ref 3.6–5.5)
RBC # BLD AUTO: 4.5 M/UL (ref 4.2–5.4)
SODIUM SERPL-SCNC: 139 MMOL/L (ref 135–145)
SODIUM SERPL-SCNC: 139 MMOL/L (ref 135–145)
WBC # BLD AUTO: 9.7 K/UL (ref 4.8–10.8)

## 2024-08-24 PROCEDURE — 700105 HCHG RX REV CODE 258: Performed by: INTERNAL MEDICINE

## 2024-08-24 PROCEDURE — 770022 HCHG ROOM/CARE - ICU (200)

## 2024-08-24 PROCEDURE — 700102 HCHG RX REV CODE 250 W/ 637 OVERRIDE(OP): Performed by: INTERNAL MEDICINE

## 2024-08-24 PROCEDURE — 92610 EVALUATE SWALLOWING FUNCTION: CPT

## 2024-08-24 PROCEDURE — 80048 BASIC METABOLIC PNL TOTAL CA: CPT

## 2024-08-24 PROCEDURE — A9270 NON-COVERED ITEM OR SERVICE: HCPCS | Performed by: INTERNAL MEDICINE

## 2024-08-24 PROCEDURE — 99233 SBSQ HOSP IP/OBS HIGH 50: CPT | Performed by: INTERNAL MEDICINE

## 2024-08-24 PROCEDURE — 700111 HCHG RX REV CODE 636 W/ 250 OVERRIDE (IP): Mod: JZ | Performed by: INTERNAL MEDICINE

## 2024-08-24 PROCEDURE — 700101 HCHG RX REV CODE 250: Performed by: INTERNAL MEDICINE

## 2024-08-24 PROCEDURE — 85025 COMPLETE CBC W/AUTO DIFF WBC: CPT

## 2024-08-24 PROCEDURE — 83735 ASSAY OF MAGNESIUM: CPT

## 2024-08-24 PROCEDURE — 71045 X-RAY EXAM CHEST 1 VIEW: CPT

## 2024-08-24 PROCEDURE — 93010 ELECTROCARDIOGRAM REPORT: CPT | Performed by: INTERNAL MEDICINE

## 2024-08-24 PROCEDURE — 92612 ENDOSCOPY SWALLOW (FEES) VID: CPT

## 2024-08-24 PROCEDURE — 93005 ELECTROCARDIOGRAM TRACING: CPT | Performed by: INTERNAL MEDICINE

## 2024-08-24 PROCEDURE — 84100 ASSAY OF PHOSPHORUS: CPT

## 2024-08-24 RX ORDER — ENOXAPARIN SODIUM 100 MG/ML
40 INJECTION SUBCUTANEOUS DAILY
Status: DISCONTINUED | OUTPATIENT
Start: 2024-08-24 | End: 2024-08-27 | Stop reason: HOSPADM

## 2024-08-24 RX ORDER — POTASSIUM CHLORIDE 7.45 MG/ML
10 INJECTION INTRAVENOUS
Status: COMPLETED | OUTPATIENT
Start: 2024-08-24 | End: 2024-08-24

## 2024-08-24 RX ORDER — SIMVASTATIN 20 MG
40 TABLET ORAL EVERY EVENING
Status: DISCONTINUED | OUTPATIENT
Start: 2024-08-24 | End: 2024-08-25

## 2024-08-24 RX ORDER — QUETIAPINE FUMARATE 25 MG/1
50 TABLET, FILM COATED ORAL NIGHTLY
Status: DISCONTINUED | OUTPATIENT
Start: 2024-08-24 | End: 2024-08-27 | Stop reason: HOSPADM

## 2024-08-24 RX ADMIN — DEXMEDETOMIDINE 0.6 MCG/KG/HR: 100 INJECTION, SOLUTION INTRAVENOUS at 00:27

## 2024-08-24 RX ADMIN — SIMVASTATIN 40 MG: 20 TABLET, FILM COATED ORAL at 17:06

## 2024-08-24 RX ADMIN — ENOXAPARIN SODIUM 40 MG: 100 INJECTION SUBCUTANEOUS at 17:07

## 2024-08-24 RX ADMIN — FAMOTIDINE 20 MG: 10 INJECTION, SOLUTION INTRAVENOUS at 06:05

## 2024-08-24 RX ADMIN — HYDRALAZINE HYDROCHLORIDE 20 MG: 20 INJECTION INTRAMUSCULAR; INTRAVENOUS at 21:06

## 2024-08-24 RX ADMIN — HYDRALAZINE HYDROCHLORIDE 20 MG: 20 INJECTION INTRAMUSCULAR; INTRAVENOUS at 01:10

## 2024-08-24 RX ADMIN — DEXMEDETOMIDINE 0.8 MCG/KG/HR: 100 INJECTION, SOLUTION INTRAVENOUS at 17:45

## 2024-08-24 RX ADMIN — POTASSIUM CHLORIDE 10 MEQ: 7.46 INJECTION, SOLUTION INTRAVENOUS at 07:52

## 2024-08-24 RX ADMIN — SENNOSIDES AND DOCUSATE SODIUM 2 TABLET: 50; 8.6 TABLET ORAL at 17:06

## 2024-08-24 RX ADMIN — QUETIAPINE FUMARATE 50 MG: 25 TABLET ORAL at 21:06

## 2024-08-24 RX ADMIN — POTASSIUM CHLORIDE 10 MEQ: 7.46 INJECTION, SOLUTION INTRAVENOUS at 09:02

## 2024-08-24 ASSESSMENT — PAIN DESCRIPTION - PAIN TYPE
TYPE: ACUTE PAIN

## 2024-08-24 ASSESSMENT — FIBROSIS 4 INDEX: FIB4 SCORE: 1.6

## 2024-08-24 NOTE — THERAPY
"Speech Language Pathology   Clinical Swallow Evaluation     Patient Name: Mariana Dumont  AGE:  68 y.o., SEX:  female  Medical Record #: [id number]  Date of Service: 2024      History of Present Illness  69 y/o female admitted  from outside facility with slurred speech and R sided weakness. Tenecteplase administered at outside facility. Intubated -.    CMHx: Acute encephalopathy, acute respiratory failure  PMHx: HTN, GERD, dyslipidemia    MRI Brain :  \"MRI OF THE BRAIN WITHOUT AND WITH CONTRAST WITHIN NORMAL LIMITS.\"    CXR :  \"Increased right basilar opacity, edema and/or developing pneumonitis.\"    General Information:  Vitals  Pulse Oximetry: 97 %  O2 (LPM): 2  O2 Delivery Device: Silicone Nasal Cannula  Level of Consciousness: Awake, Drowsy  Patient Behaviors: Confused, Drowsy  Orientation: Self, , Current year  Follows Directives: Yes - simple commands only    Prior Living Situation & Level of Function:  Swallowing: RG/TN with hx of medially managed reflux per daughter at bedside     Oral Mechanism Evaluation:  Dentition: Fair, Some missing dentition   Facial Symmetry: Equal  Facial Sensation: Pt did not follow commands to assess     Labial Observations: Right sided weakness   Lingual Observations: Midline, Xerostomia  Motor Speech: Mumbling, unclear speech throughout      Laryngeal Function:  Secretion Management: Adequate  Voice Quality: Whisper, Hoarse  Cough: Perceptually weak  Comments: Cued cough perceptually weaker than spontaneous cough    Subjective  Pt agreeable and cooperative with SLP evaluation tasks. \"Water.\"      Assessment  Current Method of Nutrition: NPO until cleared by speech pathology  Positioning: Gilliam's (60-90 degrees)  Bolus Administration: SLP  O2 (LPM): 2 O2 Delivery Device: Silicone Nasal Cannula  Factor(s) Affecting Performance: Impaired mental status, Impaired endurance  Tracheostomy : No    Swallowing Trials:  Thin Liquid (TN0): Impaired  Liquidised " "(LQ3): Impaired      Comments:   Adequate oral phase for trialed consistencies. Immediate cough/throat clear following trials of TN0 and LQ3, concerning for airway invasion. Pt verbalized agreement to FEES but is likely to need reinforcement. Daughter at bedside demonstrated excellent learning evidence to SLP recommendations.       Clinical Impressions  Pt presents with clinical indicators of and is at elevated risk for oropharyngeal dysphagia given acute encephalopathy and CXR findings. Pt would benefit from further evaluation of swallow using FEES prior to meaningful initiation of PO. Pt should have ice chips after oral care with assistance from trained staff to mitigate the impacts of xerostomia and disuse atrophy as well as to provide comfort and aid with secretion management.       Recommendations  NPO pending FEES   - Appropriate for ice chips as desired after oral care  Instrumentation: FEES  Medication: Defer to M.D.  Supervision: Careful 1:1 hand feeding from staff or family for ice chips  Oral Care: Q2h         SLP Treatment Plan  Treatment Plan: Dysphagia Treatment, Patient/Family/Caregiver Training (SLE/cog as appropriate/indicated)  SLP Frequency: 4x Per Week  Estimated Duration: Until Therapy Goals Met      Anticipated Discharge Needs  Discharge Recommendations: Recommend post-acute placement for additional speech therapy services prior to discharge home   Therapy Recommendations Upon DC: Dysphagia Training, Community Re-Integration, Patient / Family / Caregiver Education        Patient / Family Goals  Patient / Family Goal #1: \"Water\"  Short Term Goals  Short Term Goal # 1: Pt will complete FEES w SLP to further evaluate swallow function and inform POC.      Sydney Fontaine, SLP   "

## 2024-08-24 NOTE — CARE PLAN
The patient is Watcher - Medium risk of patient condition declining or worsening    Shift Goals  Clinical Goals: SBP <160, Q2 Neuro assessments  Patient Goals: NIMESH  Family Goals: Updates, understanding    Progress made toward(s) clinical / shift goals:    Problem: Knowledge Deficit - Standard  Goal: Patient and family/care givers will demonstrate understanding of plan of care, disease process/condition, diagnostic tests and medications  Outcome: Progressing     Problem: Skin Integrity  Goal: Skin integrity is maintained or improved  Outcome: Progressing     Problem: Fall Risk  Goal: Patient will remain free from falls  Outcome: Progressing

## 2024-08-24 NOTE — CARE PLAN
The patient is Watcher - Medium risk of patient condition declining or worsening    Shift Goals  Clinical Goals: SBP less 160, Q2 neuro exams  Patient Goals: bryanna  Family Goals: updates, PT to wake up more    Progress made toward(s) clinical / shift goals:    Problem: Knowledge Deficit - Standard  Goal: Patient and family/care givers will demonstrate understanding of plan of care, disease process/condition, diagnostic tests and medications  Outcome: Progressing     Problem: Fall Risk  Goal: Patient will remain free from falls  Outcome: Progressing     Problem: Pain - Standard  Goal: Alleviation of pain or a reduction in pain to the patient’s comfort goal  Outcome: Progressing       Patient is not progressing towards the following goals:

## 2024-08-24 NOTE — ASSESSMENT & PLAN NOTE
Right thyroid nodule  TSH within normal limits.  Outpatient follow-up with ultrasound is recommended.

## 2024-08-24 NOTE — THERAPY
"Speech Language Pathology   Flexible Endoscopic Evaluation of Swallowing (FEES)        Patient Name: Mariana Dumont  AGE:  68 y.o., SEX:  female  Medical Record #: [id number]  Date of Service: 8/24/2024      History of Present Illness  69 y/o female admitted 8/22 from outside facility with slurred speech and R sided weakness. Tenecteplase administered at outside facility. Intubated 8/22-8/23.    CMHx: Acute encephalopathy, acute respiratory failure  PMHx: HTN, GERD, dyslipidemia    MRI Brain 8/22:  \"MRI OF THE BRAIN WITHOUT AND WITH CONTRAST WITHIN NORMAL LIMITS.\"    CXR 8/24:  \"Increased right basilar opacity, edema and/or developing pneumonitis.\"    Pertinent Information  Current Method of Nutrition: NPO until cleared by speech pathology  Patient Behaviors: Fatigue, Restless, Anxious   Dentition: Fair   Feeding Tube: None   Tracheostomy: No   Factor(s) Affecting Performance: Impaired mental status, Impaired command following     Discussed the risks, benefits, and alternatives of the FEES procedure. Patient/family acknowledged and agreed to proceed.      Assessment  Flexible Endoscopic Evaluation of Swallowing (FEES) completed at bedside today. The endoscope was passed transnasally via Right nare to evaluate the anatomy and physiology of swallowing. Pt tolerated the procedure with no apparent distress.    Anatomic Findings: Bilateral white, space-occupying lesions on the posterior bilateral TVFs  Vocal Fold Motion: Bilateral movement  Secretion Management: [place] Secretion Scale 2/7  PO Trials: Ice Chips, Thin Liquid, Mildly Thick Liquid, Liquidised, Pudding (Attempted trial of minced and moist; patient did not accept bolus)      Consistency PAS Score Timing Residue Comments   Thin Liquid 3 During swallow Vallecular Residue: Mild (5%-25%)  Pyriform Sinus Residue: Mild (5%-25%) Tsp - PAS 1  Cup - spit out  Straw - PAS 1 x4  Rapid Straw - PAS 1 x2, PAS 3; trace PAS 3 after   Mildly Thick 1 N/A Vallecular Residue: " "Trace (1%-5%)  Pyriform Sinus Residue: Trace (1%-5%) Cup - PAS 1  Straw - unable to coordinate   Liquidised 3 During Swallow, Post Swallow Vallecular Residue: Mild (5%-25%)  Pyriform Sinus Residue: Mild (5%-25%) PAS 1, PAS 3; PAS 3 after   Pudding 3 During Swallow, Post Swallow Vallecular Residue: Mild (5%-25%)  Pyriform Sinus Residue: Mild (5%-25%) PAS 1, PAS 3 x2; PAS 3 after     Penetration-Aspiration Scale (PAS)  1     No contrast enters airway  2     Contrast enters the airway, remains above the vocal folds, and is ejected from the airway (not seen in the airway at the end of the swallow).  3     Contrast enters the airway, remains above the vocal folds, and is not ejected from the airway (is seen in the airway after the swallow).  4     Contrast enters the airway, contacts the vocal folds, and is ejected from the airway.  5     Contrast enters the airway, contacts the vocal folds, and is not ejected from the airway  6     Contrast enters the airway, crosses the plane of the vocal folds, and is ejected from the airway.  7     Contrast enters the airway, crosses the plane of the vocal folds, and is not ejected from the airway despite effort.  8     Contrast enters the airway, crosses the plane of the vocal folds, is not ejected from the airway and there is no response to aspiration.      Oral phase:  Pt expectorated cup sip of TN0; stated \"I think it's blood.\" Inconsistently able to coordinate straw suction. Able to strip bolus completely from spoon but did not always; did not accept bolus of minced and moist.      Pharyngeal phase:  Pharyngeal phase characterized by impairments in BOT retraction, laryngeal vestibule closure (LVC), pharyngeal shortening, and pharyngeal constriction, which resulted in the following:  - Penetration with TN0, LQ3, and PU4 during the swallow. Penetration of LQ3 and PU4 after the swallow as well. Penetrate generally cleared from the airway using spontaneous cleansing swallows  - Mild " vallecular and pyriform sinus residue with TN0, LQ3, and PU4 that inconsistently clear spontaneously  - Mild to moderate PPW residue with LQ3 and PU4 that inconsistently clear spontaneously    - No aspiration noted during study; cued cough was effective to clear secretions from trachea    Compensatory Strategies:  Cleansing swallow - EFFECTIVE to clear penetration and reduce pharyngeal residue (when patient would complete)  Liquid wash - EFFECTIVE to reduce pharyngeal residue      Severity Rating:  TIM: Mild      Clinical Impressions  The pt presents with a moderate oral dysphagia and mild pharyngeal dysphagia, likely acute related to acute encephalopathy. Swallow safety and efficiency are both impaired. Would recommend a puree / thin liquid diet at this time with use of 1:1 spv and alternating bites/sips. Pt may not need repeat diagnostic evaluation prior to upgrading diet pending improvements in mentation and oral phase deficits. Patient is a fair candidate for behavioral and exercise-based swallow rehabilitation. Consider training the following evidence-based swallowing exercises based on patient-specific pathophysiology: effortful swallow. Dysphagia outcomes can be maximized with use of mobility as pt is able and frequent, thorough oral care.      Recommendations  Puree solids / thin liquids  Medication: As tolerated  Supervision: Encourage self-feeding, 1:1 feeding with constant supervision  Positioning: Fully upright and midline during oral intake, Meals sitting upright in a chair, as tolerated  Strategies: Small bites/sips, Alternate bites and sips, Slow rate of intake, Reduce environmental distractions, Multiple swallows (2) per bite/sips  Oral Care: Q6h  Additional Instrumentation: Repeat diagnostic study if clinically appropriate      SLP Treatment Plan  Treatment Plan: Dysphagia Treatment, Patient/Family/Caregiver Training (SLE/cog as appropriate/indicated)  SLP Frequency: 3x Per Week  Estimated  "Duration: Until Therapy Goals Met      Anticipated Discharge Needs  Discharge Recommendations: Recommend post-acute placement for additional speech therapy services prior to discharge home   Therapy Recommendations Upon DC: Dysphagia Training, Patient / Family / Caregiver Education, Community Re-Integration       Patient / Family Goals  Patient / Family Goal #1: \"Water\"  Goal #1 Outcome: Progressing as expected  Short Term Goal # 1: Pt will complete FEES w SLP to further evaluate swallow function and inform POC.  Goal Outcome # 1: Goal met, new goal added  Short Term Goal # 1 B : Pt will consume diet of PU/TN given strategy use with no worsening of respiratory status.  Short Term Goal # 2: Pt will demonstrate appropriate oral phase with advanced solid textures given min cues from SLP.  Short Term Goal # 3: Pt will complete exercises targeting LVC, BOT retraction, and pharyngeal squeeze x50 in a session given max cues from SLP.      Sydney Fontaine, SLP  "

## 2024-08-24 NOTE — PROGRESS NOTES
"Critical Care Progress Note    Date of admission  8/22/2024    Chief Complaint  68 y.o. female admitted 8/22/2024. The entire history is obtained from healthcare providers, the medical record and the daughter at the bedside as this lady cannot provide me with any history.  This lady has a history of primary hypertension, GERD and dyslipidemia.  She was last known well at 1100 hrs. today.  She presented to [facility] with slurred speech and right-sided weakness.  Emergent imaging did not reveal acute pathology and she received tenecteplase at approximately 1300 hrs. for presumed ischemic stroke.  She was sent to [facility] for subspecialty care.  On arrival at [facility], she was barely arousable and required intubation for airway protection.  Repeat imaging after arriving at [facility] reveals no evidence of large vessel occlusion, abnormal brain perfusion or acute abnormalities on head CT.  Her daughter tells me that she has not been ill recently.     Hospital Course  8/23- No seizures seen on cEEG since admission over night. Extubated in late afternoon. Said she had taken some \"muscle relaxors\"    Interval Problem Update  Reviewed last 24 hour events:              - Increased agitation over night              - Tm: Afebrile              - HR: 70-90s              - SBP:  120-140s              - Neuro: RASS 0 to +1, CAM positive, sedation off.  Confused, MAEW              - GI: NPO              - UOP: 3.4L              - Mann: Yes              - Lines: 2X PIV              - PPx:Home PPI, No DVT               - Mobility level 2    Infusions:  Precedex    Review of Systems  Review of Systems   Unable to perform ROS: Mental acuity        Vital Signs for last 24 hours   Temp:  [36.7 °C (98.1 °F)-37.4 °C (99.3 °F)] 36.7 °C (98.1 °F)  Pulse:  [] 100  Resp:  [13-83] 83  BP: ()/() 155/101  SpO2:  [91 %-98 %] 93 %    Hemodynamic parameters for last 24 hours       Respiratory Information for the last 24 hours   "     Physical Exam  Constitutional:       General: She is not in acute distress.  HENT:      Mouth/Throat:      Mouth: Mucous membranes are moist.   Eyes:      Pupils: Pupils are equal, round, and reactive to light.   Cardiovascular:      Rate and Rhythm: Normal rate and regular rhythm.      Heart sounds: No murmur heard.     No friction rub. No gallop.   Pulmonary:      Effort: Pulmonary effort is normal. No respiratory distress.      Breath sounds: No wheezing or rales.   Abdominal:      Palpations: Abdomen is soft.   Musculoskeletal:      Cervical back: Neck supple.      Right lower leg: No edema.      Left lower leg: No edema.   Skin:     General: Skin is warm and dry.      Capillary Refill: Capillary refill takes less than 2 seconds.   Neurological:      Cranial Nerves: No cranial nerve deficit.      Comments: Awake, confused, delirious with visual hallucinations.   Psychiatric:         Mood and Affect: Mood normal.         Behavior: Behavior normal.      Comments: Awake, confused, intermittent visual hallucinations.         Medications  Current Facility-Administered Medications   Medication Dose Route Frequency Provider Last Rate Last Admin    simvastatin (Zocor) tablet 40 mg  40 mg Enteral Tube Q EVENING Solitario Hoskins M.D.        [START ON 8/25/2024] omeprazole (PriLOSEC) capsule 40 mg  40 mg Oral DAILY Solitraio Hoskins M.D.        enoxaparin (Lovenox) inj 40 mg  40 mg Subcutaneous DAILY AT 1800 Solitario Hoskins M.D.        HYDROmorphone (Dilaudid) injection 0.5 mg  0.5 mg Intravenous Q HOUR PRN Tete GAINES Latona   0.5 mg at 08/23/24 0255    dexmedetomidine (Precedex) 400 mcg/100mL infusion  0.1-1.5 mcg/kg/hr (Ideal) Intravenous Continuous Solitario Hoskins M.D.   Stopped at 08/24/24 0754    acetaminophen (Tylenol) tablet 650 mg  650 mg Enteral Tube Q6HRS PRN Roberth Johnson M.D.        senna-docusate (Pericolace Or Senokot S) 8.6-50 MG per tablet 2 Tablet  2 Tablet Enteral Tube Q EVENING Roberth Mansfield  ALEXANDRA Chi   2 Tablet at 08/22/24 1721    And    polyethylene glycol/lytes (Miralax) Packet 1 Packet  1 Packet Enteral Tube QDAY PRN Roberth Johnson M.D.        ondansetron (Zofran) syringe/vial injection 4 mg  4 mg Intravenous Q4HRS PRN Roberth Johnson M.D.        ondansetron (Zofran ODT) dispertab 4 mg  4 mg Enteral Tube Q4HRS PRN Roberth Johnson M.D.        Respiratory Therapy Consult   Nebulization Continuous RT Roberth Johnson M.D. MD Alert...ICU Electrolyte Replacement per Pharmacy   Other PHARMACY TO DOSE Roberth Johnson M.D.        ipratropium-albuterol (DUONEB) nebulizer solution  3 mL Nebulization Q2HRS PRN (RT) Roberth Johnson M.D.        hydrALAZINE (Apresoline) injection 20 mg  20 mg Intravenous Q6HRS PRN Roberth Johnson M.D.   20 mg at 08/24/24 0110    labetalol (Normodyne/Trandate) injection 10-20 mg  10-20 mg Intravenous Q4HRS PRN Roberth Johnson M.D.   10 mg at 08/23/24 2209    lactated ringers infusion   Intravenous Continuous Tete L. Latona 75 mL/hr at 08/23/24 2346 New Bag at 08/23/24 2346       Fluids    Intake/Output Summary (Last 24 hours) at 8/24/2024 1412  Last data filed at 8/24/2024 1200  Gross per 24 hour   Intake 1969.38 ml   Output 3895 ml   Net -1925.62 ml       Laboratory  Recent Labs     08/22/24  1640 08/23/24  0402   ISTATAPH 7.490 7.439   ISTATAPCO2 31.4 33.5   ISTATAPO2 180* 60*   ISTATATCO2 25 24   SGEFLAJ0COX 100* 92*   ISTATARTHCO3 23.9 22.7   ISTATARTBE 1 -1   ISTATTEMP 35.0 C 37.0 C   ISTATFIO2 60 30   ISTATSPEC Arterial Arterial   ISTATAPHTC 7.521* 7.439   YENITQLQ9GB 169* 60*         Recent Labs     08/22/24  1438 08/23/24  0438 08/24/24  0535   SODIUM 136 138 139   POTASSIUM 3.5* 3.5* 3.6   CHLORIDE 106 107 105   CO2 22 23 22   BUN 18 15 8   CREATININE 0.58 0.62 0.65   MAGNESIUM 1.8 1.8 2.2   PHOSPHORUS  --  3.7 2.6   CALCIUM 8.0* 8.7 9.3     Recent Labs     08/22/24  1438 08/23/24  0438 08/24/24  0535    ALTSGPT 19  --   --    ASTSGOT 18  --   --    ALKPHOSPHAT 59  --   --    TBILIRUBIN 0.5  --   --    GLUCOSE 96 102* 120*     Recent Labs     08/22/24  1438 08/23/24  0438 08/24/24  0535   WBC 6.0 7.5 9.7   NEUTSPOLYS 73.80* 83.00* 87.20*   LYMPHOCYTES 16.20* 7.90* 6.30*   MONOCYTES 7.50 7.00 4.60   EOSINOPHILS 1.80 1.30 1.50   BASOPHILS 0.50 0.50 0.20   ASTSGOT 18  --   --    ALTSGPT 19  --   --    ALKPHOSPHAT 59  --   --    TBILIRUBIN 0.5  --   --      Recent Labs     08/22/24  1438 08/23/24  0438 08/24/24  0535   RBC 3.78* 4.19* 4.50   HEMOGLOBIN 11.2* 12.6 13.0   HEMATOCRIT 33.8* 37.5 39.7   PLATELETCT 192 178 176   PROTHROMBTM 14.3  --   --    APTT 25.4  --   --    INR 1.10  --   --        Imaging  CT neck IMPRESSION:        1. Atherosclerosis without significant stenosis, aneurysm, or occlusion.  2. Right thyroid nodule may be further assessed with ultrasound.    Assessment/Plan  * Acute encephalopathy- (present on admission)  Assessment & Plan  Presented to outside hospital with presumed acute ischemic stroke and received tenecteplase on or about 1300 hrs. on 8/22/2024  CTA without evidence of large vessel occlusion  CTP without evidence of ischemia  CT head without acute pathology  No meningeal signs on physical examination, afebrile, no leukocytosis  STAT MRI shows no structural abnormalities, no evidence of PRES or posterior circulation stroke.  EEG shows encephalopathy pattern, no evidence of seizures  - Patient has confirmed that she took muscle relaxants for her lower leg spasms prior to the admission.  - Encephalopathy is resolving, attributed to CNS depression secondary to muscle relaxants.  - There is a component of delirium with visual hallucinations.  - Focus on resetting sleep-wake cycle  - Seroquel at night (check QTc prior to initiating medication.)      Thyroid nodule  Assessment & Plan  Right thyroid nodule  TSH within normal limits.  Outpatient follow-up with ultrasound is  recommended.    Dyslipidemia  Assessment & Plan  Check lipid panel    GERD (gastroesophageal reflux disease)  Assessment & Plan  Famotidine    Primary hypertension  Assessment & Plan  Goal SBP less than 160    Acute respiratory failure with hypoxia (HCC)  Assessment & Plan  Intubated 8/22 for airway protection  Extubated 8/23         VTE:  Contraindicated  Ulcer: H2 Antagonist  Lines: None    I have performed a physical exam and reviewed and updated ROS and Plan today (8/24/2024). In review of yesterday's note (8/23/2024), there are no changes except as documented above.     Discussed patient condition and risk of morbidity and/or mortality with Family, RN, RT, Charge nurse / hot rounds, and neurology      The patient remains critically ill.  I have assessed and reassessed the respiratory status and made ventilator adjustments based upon arterial blood gas analysis, ventilator waveforms and airway mechanics.  I have assessed and reassessed the blood pressure, hemodynamics, cardiovascular status. This patient remains at high risk for worsening cardiopulmonary dysfunction and death without the above critical care interventions.    Critical care time 40 minutes in directly providing and coordinating critical care and extensive data review.  No time overlap and excludes procedures.

## 2024-08-25 ENCOUNTER — APPOINTMENT (OUTPATIENT)
Dept: RADIOLOGY | Facility: MEDICAL CENTER | Age: 69
DRG: 917 | End: 2024-08-25
Attending: HOSPITALIST
Payer: MEDICARE

## 2024-08-25 ENCOUNTER — APPOINTMENT (OUTPATIENT)
Dept: RADIOLOGY | Facility: MEDICAL CENTER | Age: 69
DRG: 917 | End: 2024-08-25
Attending: INTERNAL MEDICINE
Payer: MEDICARE

## 2024-08-25 PROBLEM — T39.1X4S: Status: ACTIVE | Noted: 2024-08-25

## 2024-08-25 PROBLEM — T50.901A ACCIDENTAL OVERDOSE: Status: ACTIVE | Noted: 2024-08-25

## 2024-08-25 PROBLEM — T39.1X4S: Status: RESOLVED | Noted: 2024-08-25 | Resolved: 2024-08-25

## 2024-08-25 LAB
ANION GAP SERPL CALC-SCNC: 10 MMOL/L (ref 7–16)
BASOPHILS # BLD AUTO: 0.3 % (ref 0–1.8)
BASOPHILS # BLD: 0.02 K/UL (ref 0–0.12)
BUN SERPL-MCNC: 9 MG/DL (ref 8–22)
CALCIUM SERPL-MCNC: 9.4 MG/DL (ref 8.5–10.5)
CHLORIDE SERPL-SCNC: 107 MMOL/L (ref 96–112)
CO2 SERPL-SCNC: 22 MMOL/L (ref 20–33)
CREAT SERPL-MCNC: 0.57 MG/DL (ref 0.5–1.4)
EOSINOPHIL # BLD AUTO: 0.18 K/UL (ref 0–0.51)
EOSINOPHIL NFR BLD: 2.6 % (ref 0–6.9)
ERYTHROCYTE [DISTWIDTH] IN BLOOD BY AUTOMATED COUNT: 42.8 FL (ref 35.9–50)
GFR SERPLBLD CREATININE-BSD FMLA CKD-EPI: 98 ML/MIN/1.73 M 2
GLUCOSE SERPL-MCNC: 99 MG/DL (ref 65–99)
HCT VFR BLD AUTO: 39.3 % (ref 37–47)
HGB BLD-MCNC: 13.1 G/DL (ref 12–16)
IMM GRANULOCYTES # BLD AUTO: 0.04 K/UL (ref 0–0.11)
IMM GRANULOCYTES NFR BLD AUTO: 0.6 % (ref 0–0.9)
LYMPHOCYTES # BLD AUTO: 0.87 K/UL (ref 1–4.8)
LYMPHOCYTES NFR BLD: 12.5 % (ref 22–41)
MAGNESIUM SERPL-MCNC: 2.1 MG/DL (ref 1.5–2.5)
MCH RBC QN AUTO: 29.4 PG (ref 27–33)
MCHC RBC AUTO-ENTMCNC: 33.3 G/DL (ref 32.2–35.5)
MCV RBC AUTO: 88.3 FL (ref 81.4–97.8)
MONOCYTES # BLD AUTO: 0.55 K/UL (ref 0–0.85)
MONOCYTES NFR BLD AUTO: 7.9 % (ref 0–13.4)
NEUTROPHILS # BLD AUTO: 5.28 K/UL (ref 1.82–7.42)
NEUTROPHILS NFR BLD: 76.1 % (ref 44–72)
NRBC # BLD AUTO: 0 K/UL
NRBC BLD-RTO: 0 /100 WBC (ref 0–0.2)
PHOSPHATE SERPL-MCNC: 2.7 MG/DL (ref 2.5–4.5)
PLATELET # BLD AUTO: 173 K/UL (ref 164–446)
PMV BLD AUTO: 9.3 FL (ref 9–12.9)
POTASSIUM SERPL-SCNC: 3.9 MMOL/L (ref 3.6–5.5)
RBC # BLD AUTO: 4.45 M/UL (ref 4.2–5.4)
SODIUM SERPL-SCNC: 139 MMOL/L (ref 135–145)
T4 FREE SERPL-MCNC: 1.22 NG/DL (ref 0.93–1.7)
WBC # BLD AUTO: 6.9 K/UL (ref 4.8–10.8)

## 2024-08-25 PROCEDURE — 99223 1ST HOSP IP/OBS HIGH 75: CPT | Performed by: HOSPITALIST

## 2024-08-25 PROCEDURE — 700111 HCHG RX REV CODE 636 W/ 250 OVERRIDE (IP): Mod: JZ | Performed by: INTERNAL MEDICINE

## 2024-08-25 PROCEDURE — 700101 HCHG RX REV CODE 250: Performed by: INTERNAL MEDICINE

## 2024-08-25 PROCEDURE — 99233 SBSQ HOSP IP/OBS HIGH 50: CPT | Performed by: INTERNAL MEDICINE

## 2024-08-25 PROCEDURE — 84100 ASSAY OF PHOSPHORUS: CPT

## 2024-08-25 PROCEDURE — 76536 US EXAM OF HEAD AND NECK: CPT

## 2024-08-25 PROCEDURE — 700105 HCHG RX REV CODE 258: Performed by: NURSE PRACTITIONER

## 2024-08-25 PROCEDURE — 80048 BASIC METABOLIC PNL TOTAL CA: CPT

## 2024-08-25 PROCEDURE — 71045 X-RAY EXAM CHEST 1 VIEW: CPT

## 2024-08-25 PROCEDURE — A9270 NON-COVERED ITEM OR SERVICE: HCPCS | Performed by: INTERNAL MEDICINE

## 2024-08-25 PROCEDURE — 770020 HCHG ROOM/CARE - TELE (206)

## 2024-08-25 PROCEDURE — 84439 ASSAY OF FREE THYROXINE: CPT

## 2024-08-25 PROCEDURE — 700105 HCHG RX REV CODE 258: Performed by: INTERNAL MEDICINE

## 2024-08-25 PROCEDURE — 83735 ASSAY OF MAGNESIUM: CPT

## 2024-08-25 PROCEDURE — 85025 COMPLETE CBC W/AUTO DIFF WBC: CPT

## 2024-08-25 PROCEDURE — 700102 HCHG RX REV CODE 250 W/ 637 OVERRIDE(OP): Performed by: INTERNAL MEDICINE

## 2024-08-25 RX ORDER — AMOXICILLIN 250 MG
2 CAPSULE ORAL EVERY EVENING
Status: DISCONTINUED | OUTPATIENT
Start: 2024-08-25 | End: 2024-08-27 | Stop reason: HOSPADM

## 2024-08-25 RX ORDER — POTASSIUM CHLORIDE 1500 MG/1
40 TABLET, EXTENDED RELEASE ORAL ONCE
Status: COMPLETED | OUTPATIENT
Start: 2024-08-25 | End: 2024-08-25

## 2024-08-25 RX ORDER — SIMVASTATIN 20 MG
40 TABLET ORAL EVERY EVENING
Status: DISCONTINUED | OUTPATIENT
Start: 2024-08-25 | End: 2024-08-27 | Stop reason: HOSPADM

## 2024-08-25 RX ORDER — ONDANSETRON 4 MG/1
4 TABLET, ORALLY DISINTEGRATING ORAL EVERY 4 HOURS PRN
Status: DISCONTINUED | OUTPATIENT
Start: 2024-08-25 | End: 2024-08-27 | Stop reason: HOSPADM

## 2024-08-25 RX ORDER — ACETAMINOPHEN 325 MG/1
650 TABLET ORAL EVERY 6 HOURS PRN
Status: DISCONTINUED | OUTPATIENT
Start: 2024-08-25 | End: 2024-08-27 | Stop reason: HOSPADM

## 2024-08-25 RX ORDER — POLYETHYLENE GLYCOL 3350 17 G/17G
1 POWDER, FOR SOLUTION ORAL
Status: DISCONTINUED | OUTPATIENT
Start: 2024-08-25 | End: 2024-08-27 | Stop reason: HOSPADM

## 2024-08-25 RX ADMIN — QUETIAPINE FUMARATE 50 MG: 25 TABLET ORAL at 20:06

## 2024-08-25 RX ADMIN — POTASSIUM CHLORIDE 40 MEQ: 1500 TABLET, EXTENDED RELEASE ORAL at 07:57

## 2024-08-25 RX ADMIN — OMEPRAZOLE 40 MG: 20 CAPSULE, DELAYED RELEASE ORAL at 05:30

## 2024-08-25 RX ADMIN — SENNOSIDES AND DOCUSATE SODIUM 2 TABLET: 50; 8.6 TABLET ORAL at 17:05

## 2024-08-25 RX ADMIN — DEXMEDETOMIDINE 0.8 MCG/KG/HR: 100 INJECTION, SOLUTION INTRAVENOUS at 00:15

## 2024-08-25 RX ADMIN — ENOXAPARIN SODIUM 40 MG: 100 INJECTION SUBCUTANEOUS at 17:05

## 2024-08-25 RX ADMIN — SIMVASTATIN 40 MG: 20 TABLET, FILM COATED ORAL at 17:05

## 2024-08-25 RX ADMIN — SODIUM CHLORIDE, POTASSIUM CHLORIDE, SODIUM LACTATE AND CALCIUM CHLORIDE: 600; 310; 30; 20 INJECTION, SOLUTION INTRAVENOUS at 22:24

## 2024-08-25 RX ADMIN — SODIUM CHLORIDE, POTASSIUM CHLORIDE, SODIUM LACTATE AND CALCIUM CHLORIDE: 600; 310; 30; 20 INJECTION, SOLUTION INTRAVENOUS at 01:22

## 2024-08-25 ASSESSMENT — PAIN DESCRIPTION - PAIN TYPE
TYPE: ACUTE PAIN

## 2024-08-25 ASSESSMENT — FIBROSIS 4 INDEX: FIB4 SCORE: 1.65

## 2024-08-25 NOTE — ASSESSMENT & PLAN NOTE
"Pt. States she took prescription \"muscle relaxants\" (Cyclobenzoprine) for leg cramps.  Uncertain intent, she does not remember or will not tell me how many pills she took.  Pt. Remains confused and delirious.     "

## 2024-08-25 NOTE — ASSESSMENT & PLAN NOTE
Unclear  By report of her attending intensivist, she clearly stated that she took extra of her muscle relaxants.  On my interview with family present, she denies this.  She also states she does not remember anything on the day of the event.  Affect is quite flat on my examination.  She has apparently been having problems with anxiety over the last few months increasingly a problem for her.    She is now agreeable to see psychiatry, have placed consult

## 2024-08-25 NOTE — ASSESSMENT & PLAN NOTE
Intubated for airway protection  Extubated August 23  Continue O2 and RT protocols  Mobilize  Incentive spirometry  On room air 8/26

## 2024-08-25 NOTE — CONSULTS
"Hospital Medicine Consultation    Date of Service  8/25/2024    Referring Physician  Solitario Hoskins M.D.    Consulting Physician  Olu Hatch D.O.    Reason for Consultation  Acute metabolic encephalopathy    History of Presenting Illness  68 y.o. female who presented 8/22/2024 with PMHx HTN, GERD, HLD, RLS.  Pt presented on 8/22 with R side weakness and slurred speech.  Given TNK after neg CNS imaging in the ED in [place].  On arrival to our facility pt was unresponsive and required intubation for airway protection.  Pt admitted to the ICU with consultation from Neurology.    In the unit pt had an EEG showing encepahlopathy and was extubated 8/23.  At that point she reported having taken an unknown amount of a muslce relaxant thought to be cyclobenzaprine.  MRI neg.  She did have a brief run o WCT but Tele has been otherwise unremarkable    I spoke at length with the patient and her family at the bedside.  Patient herself does not recall the events that led to her being altered and intubated, she does not remember anything from that day.  She earlier had told staff that she took extra doses of her muscle relaxants, but she denies that to me in the presence of her family.   thinks she has been a little bit more confused than normal for about a month or so.  In the days leading up to the event, she did not have any complaints of fever or chills, headache, other than the tingling in her legs, she had no complaints.  Family notes that she is much more alert and more like herself, but still not quite as \"sharp\" as she would normally be.    35 minutes at the bedside in discussion with the patient and her family    Review of Systems  Review of Systems   Unable to perform ROS: Mental status change       Past Medical History   has a past medical history of Cancer (HCC).    Surgical History   has a past surgical history that includes abdominal hysterectomy total; primary c section; and " cholecystectomy.    Family History  family history is not on file.    Social History   reports that she has quit smoking. She has never used smokeless tobacco. She reports that she does not drink alcohol and does not use drugs.    Medications  Prior to Admission Medications   Prescriptions Last Dose Informant Patient Reported? Taking?   Multiple Vitamins-Minerals (CENTRUM PO) unk at [facility] Patient[facility] Home [facility], Family Member Yes No   Sig: Take 1 Tablet by mouth every day.   losartan (COZAAR) 50 MG Tab unk at [facility], Family Member Yes No   Sig: Take 50 mg by mouth every day. FOR 90 DAYS   omeprazole (PRILOSEC) 40 MG delayed-release capsule unk at [facility], Family Member No No   Sig: Take 1 Capsule by mouth every day for 30 days.   simvastatin (ZOCOR) 40 MG Tab unk at [facility], Family Member Yes No   Sig: Take 40 mg by mouth at bedtime.      Facility-Administered Medications: None       Allergies  Allergies   Allergen Reactions    Sulfa Drugs Hives       Physical Exam  Pulse:  [] 97  Resp:  [11-83] 22  BP: (106-169)/(56-88) 112/58  SpO2:  [91 %-100 %] 93 %    Physical Exam  Constitutional:       General: She is not in acute distress.     Appearance: Normal appearance. She is well-developed. She is not diaphoretic.   HENT:      Head: Normocephalic and atraumatic.   Neck:      Vascular: No JVD.   Cardiovascular:      Rate and Rhythm: Normal rate and regular rhythm.      Heart sounds: No murmur heard.  Pulmonary:      Effort: Pulmonary effort is normal. No respiratory distress.      Breath sounds: No stridor. Rales present. No wheezing.   Abdominal:      Palpations: Abdomen is soft.      Tenderness: There is no abdominal tenderness. There is no guarding or rebound.   Musculoskeletal:      Right lower leg: No edema.      Left lower leg: No edema.   Skin:     General: Skin is warm and dry.      Findings: No rash.   Neurological:      Mental Status: She is  oriented to person, place, and time.      Comments: Patient is alert and interactive  Speech is slightly delayed but content is logical and easily understood  Face metric  EOMI  Tongue midline  4/5 x 4 extremities  Patient can do finger-to-nose, but is slightly delayed bilaterally.  Per RN, she required assistance of 2 people for her balance, but not for strength when she stood.   Psychiatric:         Mood and Affect: Mood normal. Affect is flat.         Speech: Speech is delayed.         Behavior: Behavior normal.         Fluids  Date 08/25/24 0700 - 08/26/24 0659   Shift 8052-6324 8466-8948 8122-2130 24 Hour Total   INTAKE   P.O. 360   360   I.V. 456.6   456.6   Shift Total 816.6   816.6   OUTPUT   Urine 1100   1100   Shift Total 1100   1100   Weight (kg) 71.1 71.1 71.1 71.1       Laboratory  Recent Labs     08/23/24  0438 08/24/24  0535 08/25/24  0546   WBC 7.5 9.7 6.9   RBC 4.19* 4.50 4.45   HEMOGLOBIN 12.6 13.0 13.1   HEMATOCRIT 37.5 39.7 39.3   MCV 89.5 88.2 88.3   MCH 30.1 28.9 29.4   MCHC 33.6 32.7 33.3   RDW 43.0 41.9 42.8   PLATELETCT 178 176 173   MPV 9.3 9.1 9.3     Recent Labs     08/24/24  0535 08/24/24  1445 08/25/24  0546   SODIUM 139 139 139   POTASSIUM 3.6 3.9 3.9   CHLORIDE 105 106 107   CO2 22 20 22   GLUCOSE 120* 101* 99   BUN 8 8 9   CREATININE 0.65 0.61 0.57   CALCIUM 9.3 9.7 9.4     Recent Labs     08/22/24  1438   APTT 25.4   INR 1.10          Recent Labs     08/22/24  1438 08/23/24  0438   TRIGLYCERIDE 55 550*   HDL  --  39*   LDL  --  see below        Imaging  DX-CHEST-PORTABLE (1 VIEW)   Final Result      Decreased right basilar opacity. No new abnormality.         DX-CHEST-PORTABLE (1 VIEW)   Final Result      Increased right basilar opacity, edema and/or developing pneumonitis.      DX-CHEST-PORTABLE (1 VIEW)   Final Result         1. Lines and tubes as above. NGT can be advanced 5-8 cm.   2. No new consolidation or pleural effusions.         MR-BRAIN-WITH & W/O   Final Result      MRI  "OF THE BRAIN WITHOUT AND WITH CONTRAST WITHIN NORMAL LIMITS.      DX-CHEST-PORTABLE (1 VIEW)   Final Result      Mild bibasilar opacities, possibly atelectasis.      CT-CTA NECK WITH & W/O-POST PROCESSING   Final Result         1. Atherosclerosis without significant stenosis, aneurysm, or occlusion.   2. Right thyroid nodule may be further assessed with ultrasound.      CT-CTA HEAD WITH & W/O-POST PROCESS   Final Result      CT angiogram of the [place] within normal limits.      CT-CEREBRAL PERFUSION ANALYSIS   Final Result      1. Cerebral blood flow less than 30% possibly representing completed infarct = 0 mL. Based on distribution of this finding, this is unlikely to represent artifact.      2. T Max more than 6 seconds possibly representing combination of completed infarct and ischemia = 0 mL. Based on the distribution of this finding, this is unlikely to represent artifact.      3. Mismatched volume possibly representing ischemic brain/penumbra= 0 mL      4.  Please note that this cerebral perfusion study and report is Quantitative and targets supratentorial (cerebral) perfusion for evaluation of large vessel territory acute ischemia/infarction. For example, lacunar infarcts, and brainstem/posterior fossa    ischemia/infarction are not evaluated on this study.  Data acquisition is subject to artifacts which can yield non-anatomically plausible perfusion maps which may be due to motion, bolus timing, signal to noise ratio, or other technical factors.    Perfusion map abnormalities which show non-anatomic distributions are likely artifact.   This study is not \"stand-alone\" and should only be utilized for diagnosis, management/treatment in correlation with CT, CTA, and/or MRI and clinical factors.         CT-HEAD W/O   Final Result      No acute intracranial abnormality.                            Assessment/Plan  * Acute respiratory failure with hypoxia (HCC)  Assessment & Plan  Intubated for airway " protection  Extubated August 23  Continue O2 and RT protocols  Mobilize    Accidental overdose  Assessment & Plan  Unclear  By report of her attending intensivist, she clearly stated that she took extra of her muscle relaxants.  On my interview with family present, she denies this.  She also states she does not remember anything on the day of the event.  Affect is quite flat on my examination.  She has apparently been having problems with anxiety over the last few months increasingly a problem for her.  When asked if she would see psychiatry, she is hesitant.  Family is encouraging her to do so.    Dyslipidemia  Assessment & Plan  Statin    GERD (gastroesophageal reflux disease)  Assessment & Plan  PPI    Primary hypertension  Assessment & Plan  Maintained on losartan as an outpatient  Resume as pressures indicate    Acute encephalopathy- (present on admission)  Assessment & Plan  Patient had a negative CT, negative MR, metabolic workup has been negative.  No evidence of infectious process.  EEG showed encephalopathic changes, but no focal findings  Scheduled medications would not appear to be involved however there is significant possibility that she intentionally took cyclobenzaprine.  Again history is not clear as noted above  Regardless she is improving without intervention at this point.  This would seem to imply a toxic ingestion  Neurology has evaluated  Continue supportive care and continue to monitor.  If she continues to improve neurologically, then she likely does not need further workup.  If she worsens, or fails to get back to baseline, then we will need to entertain further studies

## 2024-08-25 NOTE — CARE PLAN
The patient is Stable - Low risk of patient condition declining or worsening    Shift Goals  Clinical Goals: Stable Neuro  Patient Goals: NIMESH  Family Goals: NIMESH    Progress made toward(s) clinical / shift goals:    Problem: Skin Integrity  Goal: Skin integrity is maintained or improved  Outcome: Progressing     Problem: Fall Risk  Goal: Patient will remain free from falls  Outcome: Progressing     Problem: Pain - Standard  Goal: Alleviation of pain or a reduction in pain to the patient’s comfort goal  Outcome: Progressing       Patient is not progressing towards the following goals:      Problem: Knowledge Deficit - Standard  Goal: Patient and family/care givers will demonstrate understanding of plan of care, disease process/condition, diagnostic tests and medications  Outcome: Not Progressing

## 2024-08-25 NOTE — CARE PLAN
The patient is Watcher - Medium risk of patient condition declining or worsening    Shift Goals  Clinical Goals: Q2 neuro exams.  Patient Goals: bryanna  Family Goals: updates    Progress made toward(s) clinical / shift goals:    Problem: Knowledge Deficit - Standard  Goal: Patient and family/care givers will demonstrate understanding of plan of care, disease process/condition, diagnostic tests and medications  Outcome: Progressing     Problem: Fall Risk  Goal: Patient will remain free from falls  Outcome: Progressing     Problem: Pain - Standard  Goal: Alleviation of pain or a reduction in pain to the patient’s comfort goal  Outcome: Progressing       Patient is not progressing towards the following goals:

## 2024-08-25 NOTE — PROGRESS NOTES
"Critical Care Progress Note    Date of admission  8/22/2024    Chief Complaint  68 y.o. female admitted 8/22/2024. The entire history is obtained from healthcare providers, the medical record and the daughter at the bedside as this lady cannot provide me with any history.  This lady has a history of primary hypertension, GERD and dyslipidemia.  She was last known well at 1100 hrs. today.  She presented to [facility] with slurred speech and right-sided weakness.  Emergent imaging did not reveal acute pathology and she received tenecteplase at approximately 1300 hrs. for presumed ischemic stroke.  She was sent to [facility] for subspecialty care.  On arrival at [facility], she was barely arousable and required intubation for airway protection.  Repeat imaging after arriving at [facility] reveals no evidence of large vessel occlusion, abnormal brain perfusion or acute abnormalities on head CT.  Her daughter tells me that she has not been ill recently.     Hospital Course  8/23- No seizures seen on cEEG since admission over night. Extubated in late afternoon. Said she had taken some \"muscle relaxors\"  8/24- remains extubated. Delirium noted.  8/25- Clinically appropriate for transfer to neuroscience floor.    Interval Problem Update  Reviewed last 24 hour events:              - Increased agitation over night              - Tm: Afebrile              - HR: 70-90s              - SBP:  120-140s              - Neuro: RASS -1, CAM positive, MAEW              - GI: Level 4 diet with thins              - UOP: 3.4L              - Mann: Yes              - Lines: 2X PIV              - PPx:Home PPI, No DVT               - Mobility level 2      Review of Systems  Review of Systems   Unable to perform ROS: Mental acuity        Vital Signs for last 24 hours   Pulse:  [] 97  Resp:  [11-83] 22  BP: (106-169)/(56-88) 112/58  SpO2:  [91 %-100 %] 93 %    Hemodynamic parameters for last 24 hours       Respiratory Information for the last " 24 hours       Physical Exam  Constitutional:       General: She is not in acute distress.  HENT:      Mouth/Throat:      Mouth: Mucous membranes are moist.   Eyes:      Pupils: Pupils are equal, round, and reactive to light.   Cardiovascular:      Rate and Rhythm: Normal rate and regular rhythm.      Heart sounds: No murmur heard.     No friction rub. No gallop.   Pulmonary:      Effort: Pulmonary effort is normal. No respiratory distress.      Breath sounds: No wheezing or rales.   Abdominal:      Palpations: Abdomen is soft.   Musculoskeletal:      Cervical back: Neck supple.      Right lower leg: No edema.      Left lower leg: No edema.   Skin:     General: Skin is warm and dry.      Capillary Refill: Capillary refill takes less than 2 seconds.   Neurological:      Cranial Nerves: No cranial nerve deficit.      Comments: Awake, confused, MAEW   Psychiatric:         Mood and Affect: Mood normal.         Behavior: Behavior normal.      Comments: Awake, confused, intermittent visual hallucinations.         Medications  Current Facility-Administered Medications   Medication Dose Route Frequency Provider Last Rate Last Admin    senna-docusate (Pericolace Or Senokot S) 8.6-50 MG per tablet 2 Tablet  2 Tablet Oral Q EVENING Solitario Hoskins M.D.        And    polyethylene glycol/lytes (Miralax) Packet 1 Packet  1 Packet Oral QDAY PRN Solitario Hoskins M.D.        simvastatin (Zocor) tablet 40 mg  40 mg Oral Q EVENING Solitario Hoskins M.D.        acetaminophen (Tylenol) tablet 650 mg  650 mg Oral Q6HRS PRN Solitario Hoskins M.D.        ondansetron (Zofran ODT) dispertab 4 mg  4 mg Oral Q4HRS PRN Solitario Hoskins M.D.        omeprazole (PriLOSEC) capsule 40 mg  40 mg Oral DAILY Solitario Hoskins M.D.   40 mg at 08/25/24 0530    enoxaparin (Lovenox) inj 40 mg  40 mg Subcutaneous DAILY AT 1800 Solitario Hoskins M.D.   40 mg at 08/24/24 1707    QUEtiapine (SEROquel) tablet 50 mg  50 mg Oral Nightly Solitario Hoskins M.D.   50 mg at  08/24/24 2106    HYDROmorphone (Dilaudid) injection 0.5 mg  0.5 mg Intravenous Q HOUR PRN Tete L. Latona   0.5 mg at 08/23/24 0255    dexmedetomidine (Precedex) 400 mcg/100mL infusion  0.1-1.5 mcg/kg/hr (Ideal) Intravenous Continuous Solitario Hoskins M.D.   Stopped at 08/25/24 0756    ondansetron (Zofran) syringe/vial injection 4 mg  4 mg Intravenous Q4HRS PRN Roberth Johnson M.D.        Respiratory Therapy Consult   Nebulization Continuous RT Roberth Johnson M.D. MD Alert...ICU Electrolyte Replacement per Pharmacy   Other PHARMACY TO DOSE Roberth Johnson M.D.        ipratropium-albuterol (DUONEB) nebulizer solution  3 mL Nebulization Q2HRS PRN (RT) Roberth Johnson M.D.        hydrALAZINE (Apresoline) injection 20 mg  20 mg Intravenous Q6HRS PRN Roberth Johnson M.D.   20 mg at 08/24/24 2106    labetalol (Normodyne/Trandate) injection 10-20 mg  10-20 mg Intravenous Q4HRS PRN Roberth Johnson M.D.   10 mg at 08/23/24 2209    lactated ringers infusion   Intravenous Continuous Tete LIANA Latona 75 mL/hr at 08/25/24 0122 New Bag at 08/25/24 0122       Fluids    Intake/Output Summary (Last 24 hours) at 8/25/2024 1222  Last data filed at 8/25/2024 1200  Gross per 24 hour   Intake 2426.56 ml   Output 2515 ml   Net -88.44 ml       Laboratory  Recent Labs     08/22/24  1640 08/23/24  0402   ISTATAPH 7.490 7.439   ISTATAPCO2 31.4 33.5   ISTATAPO2 180* 60*   ISTATATCO2 25 24   UUSTQHD2WPO 100* 92*   ISTATARTHCO3 23.9 22.7   ISTATARTBE 1 -1   ISTATTEMP 35.0 C 37.0 C   ISTATFIO2 60 30   ISTATSPEC Arterial Arterial   ISTATAPHTC 7.521* 7.439   WVXQBFMQ6EW 169* 60*         Recent Labs     08/24/24  0535 08/24/24  1445 08/25/24  0546   SODIUM 139 139 139   POTASSIUM 3.6 3.9 3.9   CHLORIDE 105 106 107   CO2 22 20 22   BUN 8 8 9   CREATININE 0.65 0.61 0.57   MAGNESIUM 2.2 2.1 2.1   PHOSPHORUS 2.6 2.3* 2.7   CALCIUM 9.3 9.7 9.4     Recent Labs     08/22/24  1438 08/23/24  0438 08/24/24  0535  08/24/24  1445 08/25/24  0546   ALTSGPT 19  --   --   --   --    ASTSGOT 18  --   --   --   --    ALKPHOSPHAT 59  --   --   --   --    TBILIRUBIN 0.5  --   --   --   --    GLUCOSE 96   < > 120* 101* 99    < > = values in this interval not displayed.     Recent Labs     08/22/24  1438 08/23/24  0438 08/24/24  0535 08/25/24  0546   WBC 6.0 7.5 9.7 6.9   NEUTSPOLYS 73.80* 83.00* 87.20* 76.10*   LYMPHOCYTES 16.20* 7.90* 6.30* 12.50*   MONOCYTES 7.50 7.00 4.60 7.90   EOSINOPHILS 1.80 1.30 1.50 2.60   BASOPHILS 0.50 0.50 0.20 0.30   ASTSGOT 18  --   --   --    ALTSGPT 19  --   --   --    ALKPHOSPHAT 59  --   --   --    TBILIRUBIN 0.5  --   --   --      Recent Labs     08/22/24  1438 08/23/24  0438 08/24/24  0535 08/25/24  0546   RBC 3.78* 4.19* 4.50 4.45   HEMOGLOBIN 11.2* 12.6 13.0 13.1   HEMATOCRIT 33.8* 37.5 39.7 39.3   PLATELETCT 192 178 176 173   PROTHROMBTM 14.3  --   --   --    APTT 25.4  --   --   --    INR 1.10  --   --   --        Imaging  CT neck IMPRESSION:        1. Atherosclerosis without significant stenosis, aneurysm, or occlusion.  2. Right thyroid nodule may be further assessed with ultrasound.    Assessment/Plan  * Acute respiratory failure with hypoxia (HCC)  Assessment & Plan  Intubated 8/22 for airway protection  Extubated 8/23    Acute encephalopathy- (present on admission)  Assessment & Plan  Presented to outside hospital with presumed acute ischemic stroke and received tenecteplase on or about 1300 hrs. on 8/22/2024  CTA without evidence of large vessel occlusion  CTP without evidence of ischemia  CT head without acute pathology  No meningeal signs on physical examination, afebrile, no leukocytosis  STAT MRI shows no structural abnormalities, no evidence of PRES or posterior circulation stroke.  EEG shows encephalopathy pattern, no evidence of seizures  - Patient has confirmed that she took muscle relaxants for her lower leg spasms prior to the admission.  - Encephalopathy is resolving, attributed  "to CNS depression secondary to muscle relaxants.  - There is a component of delirium with visual hallucinations.  - Focus on resetting sleep-wake cycle  - Seroquel at night (check QTc prior to initiating medication.)      Accidental overdose  Assessment & Plan  Pt. States she took prescription \"muscle relaxants\" (Cyclobenzoprine) for leg cramps.  Uncertain intent, she does not remember or will not tell me how many pills she took.  Pt. Remains confused and delirious.       Thyroid nodule  Assessment & Plan  Right thyroid nodule  TSH within normal limits.  Outpatient follow-up with ultrasound is recommended.    Dyslipidemia  Assessment & Plan  Check lipid panel    GERD (gastroesophageal reflux disease)  Assessment & Plan  Famotidine    Primary hypertension  Assessment & Plan  Goal SBP less than 160         VTE:  Contraindicated  Ulcer: H2 Antagonist  Lines: None    I have performed a physical exam and reviewed and updated ROS and Plan today (8/25/2024). In review of yesterday's note (8/24/2024), there are no changes except as documented above.     Discussed patient condition and risk of morbidity and/or mortality with Family, RN, RT, and Charge nurse / hot rounds       I have assessed and reassessed the blood pressure,  cardiovascular status, labs and response to interventions.      "

## 2024-08-25 NOTE — ASSESSMENT & PLAN NOTE
Patient had a negative CT, negative MR, metabolic workup has been negative.  No evidence of infectious process.  EEG showed encephalopathic changes, but no focal findings  Scheduled medications would not appear to be involved however there is significant possibility that she intentionally took cyclobenzaprine.  Again history is not clear as noted above  Regardless she is improving without intervention at this point.  This would seem to imply a toxic ingestion  Neurology has evaluated  Continue supportive care and continue to monitor.  Appears to continue improving, is open to psychiatric evaluation for possible FNSD

## 2024-08-26 LAB
ALBUMIN SERPL BCP-MCNC: 3.2 G/DL (ref 3.2–4.9)
ALBUMIN/GLOB SERPL: 1.1 G/DL
ALP SERPL-CCNC: 87 U/L (ref 30–99)
ALT SERPL-CCNC: 27 U/L (ref 2–50)
ANION GAP SERPL CALC-SCNC: 10 MMOL/L (ref 7–16)
AST SERPL-CCNC: 24 U/L (ref 12–45)
BASOPHILS # BLD AUTO: 0.3 % (ref 0–1.8)
BASOPHILS # BLD: 0.02 K/UL (ref 0–0.12)
BILIRUB SERPL-MCNC: 0.5 MG/DL (ref 0.1–1.5)
BUN SERPL-MCNC: 14 MG/DL (ref 8–22)
CALCIUM ALBUM COR SERPL-MCNC: 9.9 MG/DL (ref 8.5–10.5)
CALCIUM SERPL-MCNC: 9.3 MG/DL (ref 8.5–10.5)
CHLORIDE SERPL-SCNC: 104 MMOL/L (ref 96–112)
CO2 SERPL-SCNC: 24 MMOL/L (ref 20–33)
CREAT SERPL-MCNC: 0.55 MG/DL (ref 0.5–1.4)
EOSINOPHIL # BLD AUTO: 0.16 K/UL (ref 0–0.51)
EOSINOPHIL NFR BLD: 2.7 % (ref 0–6.9)
ERYTHROCYTE [DISTWIDTH] IN BLOOD BY AUTOMATED COUNT: 41.7 FL (ref 35.9–50)
GFR SERPLBLD CREATININE-BSD FMLA CKD-EPI: 99 ML/MIN/1.73 M 2
GLOBULIN SER CALC-MCNC: 2.9 G/DL (ref 1.9–3.5)
GLUCOSE SERPL-MCNC: 101 MG/DL (ref 65–99)
HCT VFR BLD AUTO: 38.6 % (ref 37–47)
HGB BLD-MCNC: 12.7 G/DL (ref 12–16)
IMM GRANULOCYTES # BLD AUTO: 0.02 K/UL (ref 0–0.11)
IMM GRANULOCYTES NFR BLD AUTO: 0.3 % (ref 0–0.9)
LYMPHOCYTES # BLD AUTO: 1.1 K/UL (ref 1–4.8)
LYMPHOCYTES NFR BLD: 18.7 % (ref 22–41)
MAGNESIUM SERPL-MCNC: 2 MG/DL (ref 1.5–2.5)
MCH RBC QN AUTO: 28.8 PG (ref 27–33)
MCHC RBC AUTO-ENTMCNC: 32.9 G/DL (ref 32.2–35.5)
MCV RBC AUTO: 87.5 FL (ref 81.4–97.8)
MONOCYTES # BLD AUTO: 0.49 K/UL (ref 0–0.85)
MONOCYTES NFR BLD AUTO: 8.3 % (ref 0–13.4)
NEUTROPHILS # BLD AUTO: 4.08 K/UL (ref 1.82–7.42)
NEUTROPHILS NFR BLD: 69.7 % (ref 44–72)
NRBC # BLD AUTO: 0 K/UL
NRBC BLD-RTO: 0 /100 WBC (ref 0–0.2)
PHOSPHATE SERPL-MCNC: 2.7 MG/DL (ref 2.5–4.5)
PLATELET # BLD AUTO: 206 K/UL (ref 164–446)
PMV BLD AUTO: 9.3 FL (ref 9–12.9)
POTASSIUM SERPL-SCNC: 3.6 MMOL/L (ref 3.6–5.5)
PROT SERPL-MCNC: 6.1 G/DL (ref 6–8.2)
RBC # BLD AUTO: 4.41 M/UL (ref 4.2–5.4)
SODIUM SERPL-SCNC: 138 MMOL/L (ref 135–145)
WBC # BLD AUTO: 5.9 K/UL (ref 4.8–10.8)

## 2024-08-26 PROCEDURE — 96366 THER/PROPH/DIAG IV INF ADDON: CPT

## 2024-08-26 PROCEDURE — 700111 HCHG RX REV CODE 636 W/ 250 OVERRIDE (IP): Mod: JZ | Performed by: INTERNAL MEDICINE

## 2024-08-26 PROCEDURE — 85025 COMPLETE CBC W/AUTO DIFF WBC: CPT

## 2024-08-26 PROCEDURE — 97535 SELF CARE MNGMENT TRAINING: CPT

## 2024-08-26 PROCEDURE — 96365 THER/PROPH/DIAG IV INF INIT: CPT

## 2024-08-26 PROCEDURE — 700102 HCHG RX REV CODE 250 W/ 637 OVERRIDE(OP): Performed by: INTERNAL MEDICINE

## 2024-08-26 PROCEDURE — 31500 INSERT EMERGENCY AIRWAY: CPT

## 2024-08-26 PROCEDURE — 80053 COMPREHEN METABOLIC PANEL: CPT

## 2024-08-26 PROCEDURE — A9270 NON-COVERED ITEM OR SERVICE: HCPCS | Performed by: INTERNAL MEDICINE

## 2024-08-26 PROCEDURE — 36415 COLL VENOUS BLD VENIPUNCTURE: CPT

## 2024-08-26 PROCEDURE — 97163 PT EVAL HIGH COMPLEX 45 MIN: CPT

## 2024-08-26 PROCEDURE — 97530 THERAPEUTIC ACTIVITIES: CPT

## 2024-08-26 PROCEDURE — 700105 HCHG RX REV CODE 258: Performed by: NURSE PRACTITIONER

## 2024-08-26 PROCEDURE — 96375 TX/PRO/DX INJ NEW DRUG ADDON: CPT

## 2024-08-26 PROCEDURE — 770020 HCHG ROOM/CARE - TELE (206)

## 2024-08-26 PROCEDURE — 84100 ASSAY OF PHOSPHORUS: CPT

## 2024-08-26 PROCEDURE — 99233 SBSQ HOSP IP/OBS HIGH 50: CPT | Performed by: STUDENT IN AN ORGANIZED HEALTH CARE EDUCATION/TRAINING PROGRAM

## 2024-08-26 PROCEDURE — 83735 ASSAY OF MAGNESIUM: CPT

## 2024-08-26 PROCEDURE — 92526 ORAL FUNCTION THERAPY: CPT

## 2024-08-26 PROCEDURE — 97166 OT EVAL MOD COMPLEX 45 MIN: CPT

## 2024-08-26 RX ADMIN — SENNOSIDES AND DOCUSATE SODIUM 2 TABLET: 50; 8.6 TABLET ORAL at 17:34

## 2024-08-26 RX ADMIN — QUETIAPINE FUMARATE 50 MG: 25 TABLET ORAL at 20:45

## 2024-08-26 RX ADMIN — SIMVASTATIN 40 MG: 20 TABLET, FILM COATED ORAL at 17:34

## 2024-08-26 RX ADMIN — OMEPRAZOLE 40 MG: 20 CAPSULE, DELAYED RELEASE ORAL at 04:37

## 2024-08-26 RX ADMIN — SODIUM CHLORIDE, POTASSIUM CHLORIDE, SODIUM LACTATE AND CALCIUM CHLORIDE: 600; 310; 30; 20 INJECTION, SOLUTION INTRAVENOUS at 20:48

## 2024-08-26 RX ADMIN — ENOXAPARIN SODIUM 40 MG: 100 INJECTION SUBCUTANEOUS at 17:34

## 2024-08-26 ASSESSMENT — ACTIVITIES OF DAILY LIVING (ADL): TOILETING: INDEPENDENT

## 2024-08-26 ASSESSMENT — FIBROSIS 4 INDEX: FIB4 SCORE: 1.55

## 2024-08-26 ASSESSMENT — COGNITIVE AND FUNCTIONAL STATUS - GENERAL
DAILY ACTIVITIY SCORE: 15
SUGGESTED CMS G CODE MODIFIER MOBILITY: CK
MOBILITY SCORE: 18
TOILETING: A LOT
PERSONAL GROOMING: A LITTLE
MOVING TO AND FROM BED TO CHAIR: A LITTLE
HELP NEEDED FOR BATHING: A LOT
EATING MEALS: A LITTLE
DRESSING REGULAR UPPER BODY CLOTHING: A LITTLE
WALKING IN HOSPITAL ROOM: A LITTLE
STANDING UP FROM CHAIR USING ARMS: A LITTLE
TURNING FROM BACK TO SIDE WHILE IN FLAT BAD: A LITTLE
MOVING FROM LYING ON BACK TO SITTING ON SIDE OF FLAT BED: A LITTLE
DRESSING REGULAR LOWER BODY CLOTHING: A LOT
CLIMB 3 TO 5 STEPS WITH RAILING: A LITTLE
SUGGESTED CMS G CODE MODIFIER DAILY ACTIVITY: CK

## 2024-08-26 ASSESSMENT — ENCOUNTER SYMPTOMS
SHORTNESS OF BREATH: 0
SORE THROAT: 0
NAUSEA: 0
FEVER: 0
PALPITATIONS: 0
CHILLS: 0
VOMITING: 0
COUGH: 0

## 2024-08-26 ASSESSMENT — GAIT ASSESSMENTS
DEVIATION: BRADYKINETIC;OTHER (COMMENT)
GAIT LEVEL OF ASSIST: MINIMAL ASSIST
DISTANCE (FEET): 300
ASSISTIVE DEVICE: NONE;HAND HELD ASSIST

## 2024-08-26 ASSESSMENT — PAIN DESCRIPTION - PAIN TYPE
TYPE: ACUTE PAIN
TYPE: ACUTE PAIN

## 2024-08-26 NOTE — CARE PLAN
The patient is Stable - Low risk of patient condition declining or worsening    Shift Goals  Clinical Goals: monitor neuro,  Patient Goals: rest  Family Goals: NIMESH    Progress made toward(s) clinical / shift goals:    Problem: Fall Risk  Goal: Patient will remain free from falls  Outcome: Progressing  Note: Fall precautions are in place. Patient is x1 assist to the chair     Problem: Pain - Standard  Goal: Alleviation of pain or a reduction in pain to the patient’s comfort goal  Outcome: Progressing  Note: Patient has not requested pain medicine today     Problem: Neuro Status  Goal: Neuro status will remain stable or improve  Outcome: Progressing  Note: Q4 neuro checks in place     Problem: Mobility  Goal: Patient's capacity to carry out activities will improve  Outcome: Progressing  Note: PT/OT consulted. Patient is able to carry out most of her daily activities independently with some assistance from the nursing staff       Patient is not progressing towards the following goals:

## 2024-08-26 NOTE — THERAPY
Occupational Therapy   Initial Evaluation     Patient Name: Mariana Dumont  Age:  68 y.o., Sex:  female  Medical Record #: [id number]  Today's Date: 8/26/2024     Precautions: Fall Risk, Swallow Precautions    Assessment  Patient is 68 y.o. female who presented to outside facility with R sided weakness and slurred speech. TNK was administered to pt at outside for presumed ischemic stroke. Imaging did not reveal acute pathology. Pt was transferred to [facility] for a higher level of care and upon arrival pt was barely arousable and required intubation (8/21) for airway protection; extubated 8/22. Pt was diagnosed with encephalopathy and accidental overdose. PMHx of HTN, GERD, dyslipidemia, and thyroid nodule. Pt seen for OT eval and tx. Pt currently resides with her  in a 2-story house and was independent with ADLs and IADLs.     During OT eval, pt presented with generalized weakness as well as deficits in self-care tasks, balance, functional mobility, cognition, coordination, and activity tolerance. She required min A -mod A to complete most ADLs and txfs using FWW. Pt presented with dysmetria in RUE when completing finger to nose test and opposition. Pt with delayed processing necessitating increased time to complete tasks. Pt and spouse provided education on role of acute OT, transfers, compensatory strategies to complete ADLs, and importance of OOB ADLs to reduce risk of deconditioning .Currently recommend post-acute placement prior to DC home as pt is currently functioning below her baseline. Will continue to follow for ongoing acute OT services.     Plan    Occupational Therapy Initial Treatment Plan   Treatment Interventions: Self Care / Activities of Daily Living, Adaptive Equipment, Neuro Re-Education / Balance, Therapeutic Exercises, Therapeutic Activity, Family / Caregiver Training  Treatment Frequency: 4 Times per Week  Duration: Until Therapy Goals Met    DC Equipment Recommendations: Unable to determine  at this time  Discharge Recommendations: Recommend post-acute placement for additional occupational therapy services prior to discharge home      Objective      Prior Living Situation   Prior Services Home-Independent   Housing / Facility 2 Story House   Steps Into Home 3   Steps In Home   (Full flight, however, pt's bed/bath are on the ground floor and pt does not need to access the upper level)   Rail Left Rail  (Steps into Home)   Bathroom Set up Bathtub / Shower Combination  (Pt's spouse reports that they are planning on remodeling the bathroom to make it a walk-in shower and intend to acquire a shower chair and install grab bars and hand held shower head.)   Equipment Owned None   Lives with - Patient's Self Care Capacity Spouse   Comments Pt currently resides with her  who is retired and is able to provide assist as needed.   Prior Level of ADL Function   Self Feeding Independent   Grooming / Hygiene Independent   Bathing Independent   Dressing Independent   Toileting Independent   Prior Level of IADL Function   Medication Management Independent   Laundry Independent   Kitchen Mobility Independent   Finances Independent   Home Management Independent   Shopping Independent   Prior Level Of Mobility Independent Without Device in Community;Independent Without Device in Home   Driving / Transportation Driving Independent   Occupation (Pre-Hospital Vocational) Retired Due To Age   Leisure Interests Exercise;Pets;Family;Gardening   Precautions   Precautions Fall Risk;Swallow Precautions   Vitals   O2 Delivery Device None - Room Air   Pain 0 - 10 Group   Therapist Pain Assessment Post Activity Pain Same as Prior to Activity;Nurse Notified  (Not rated, agreeable to activity)   Cognition    Cognition / Consciousness X   Speech/ Communication Delayed Responses   Level of Consciousness Alert   Attention Impaired   Sequencing Impaired   Initiation Impaired   Comments Pt is A&O x4, pleasant, cooperative, and  receptive to education. Pt with flat affect, delayed processing, and often seen looking to her spouse for assist   Active ROM Upper Body   Active ROM Upper Body  WDL   Dominant Hand Right   Strength Upper Body   Upper Body Strength  X   Comments LUE grossly 4/5, RUE grossly 3+/5   Sensation Upper Body   Comments Reports having numbness and tingling in RUE   Coordination Upper Body   Coordination X   Comments Increased time to complete opposition and finger to nose in LUE; dysmetria noted in RUE with opposition and finger to nose test; required increased time and cues to complete. Slight delay in RUE when completing alternating hand movements.   Balance Assessment   Sitting Balance (Static) Fair   Sitting Balance (Dynamic) Fair -   Standing Balance (Static) Poor +   Standing Balance (Dynamic) Poor   Weight Shift Sitting Fair   Weight Shift Standing Good   Bed Mobility    Supine to Sit Minimal Assist   Sit to Supine   (Up to chair post)   Scooting Minimal Assist   Comments HOB elevated   ADL Assessment   Eating Minimal Assist  (Demo tremulous activity when opening twist top water bottle and when bringing it to her mouth)   Grooming   (NT; reported that she had completed oral care prior to start of session. Declined need to complete other g/h tasks)   Lower Body Dressing Maximal Assist   Toileting   (NT; declined need during session)   How much help from another person does the patient currently need...   6 Clicks Daily Activity Score 15   Functional Mobility   Sit to Stand Minimal Assist   Bed, Chair, Wheelchair Transfer Moderate Assist   Toilet Transfers   (NT; declined need during session)   Mobility EOB > chair; w/FWW   Comments Attempted to complete txf prematurely necessitating increased cues to complete full quarter turn to ensure that she can feel the surface she is attempting to sit on on the backs of her leg prior to beginning descent.   Activity Tolerance   Sitting in Chair Up to chair post   Sitting Edge of  Bed 5 min   Standing 3 min   Comments Limited by fatigue and decreased activity tolerance   Patient / Family Goals   Patient / Family Goal #1 To get better   Short Term Goals   Short Term Goal # 1 Pt will complete ADL txfs with supv   Short Term Goal # 2 Pt will complete LB dressing with supv using AE PRN   Short Term Goal # 3 Pt will complete toileting ADLs with supv   Short Term Goal # 4 Pt will complete standindg g/h routine with min A   Education Group   Education Provided Role of Occupational Therapist;Activities of Daily Living;Pathology of bedrest;Transfers   Role of Occupational Therapist Patient Response Patient;Significant Other;Acceptance;Explanation;Verbal Demonstration   Transfers Patient Response Patient;Significant Other;Acceptance;Explanation;Verbal Demonstration;Action Demonstration;Reinforcement Needed   ADL Patient Response Patient;Significant Other;Acceptance;Explanation;Verbal Demonstration;Action Demonstration;Reinforcement Needed   Pathology of Bedrest Patient Response Patient;Significant Other;Acceptance;Explanation;Verbal Demonstration;Action Demonstration;Reinforcement Needed

## 2024-08-26 NOTE — THERAPY
"Physical Therapy   Initial Evaluation     Patient Name: Mariana Dumont  Age:  68 y.o., Sex:  female  Medical Record #: [id number]  Today's Date: 8/26/2024     Precautions  Precautions: Fall Risk;Swallow Precautions    Assessment  Patient is 68 y.o. female from [facility] for slurred speech and right sided weakness, aphasia, received TNK.   On arrival at [facility], she was barely arousable and required intubation for airway protection. Dx'd with Acute encephalopathy, imaging of head negative. Accidental overdose-She earlier had told staff that she took extra doses of her muscle relaxants, but she denied in the presence of her family. Hx of CA, HLD, GERD, HTN, increased anxiety recently.     Pt lives with spouse, independent. Pt with flat affect, quiet voice. Confused- use the \"larisa\" for foot and \"fish\" for her thigh while testing her sensation- pt able to correct self with cueing. Pt stated she wanted to go to the bathroom, approached bathroom, the door was open but pt instead turned to enter the closet. Oriented to self, year, stated \"school\" then corrected to hospital and reason. Difficulties following commands with MMT and required Verbal cues and assistance. Amb in coello with HHA and improved to CGA/min A for 1 loss of balance while turning left and while distracted. Pt with appropriate righting reactions. Pt would benefit from PM&R consult. Pt can tolerate up to 15 hours of therapies a week, is motivated and willing to participate and has a good and safe discharge plan. Will continue to assist pt while in the acute setting.       Plan    Physical Therapy Initial Treatment Plan   Treatment Plan : Bed Mobility, Gait Training, Neuro Re-Education / Balance, Therapeutic Activities, Therapeutic Exercise  Treatment Frequency: 4 Times per Week  Duration: Until Therapy Goals Met    DC Equipment Recommendations: Unable to determine at this time  Discharge Recommendations: Recommend post-acute placement for additional " physical therapy services prior to discharge home       Subjective    Pt sitting in chair, supportive daughter at bedside. Daughter is wondering it pt has conversion disorder. She also expressed concern about the pt's numbness in bilat lower legs that recently started before admission.      Objective       08/26/24 1123   Time In/Time Out   Therapy Start Time 1059   Therapy End Time 1123   Total Therapy Time 24   Initial Contact Note    Initial Contact Note Order Received and Verified, Physical Therapy Evaluation in Progress with Full Report to Follow.   Precautions   Precautions Fall Risk;Swallow Precautions   Vitals   O2 Delivery Device None - Room Air   Prior Living Situation   Prior Services Home-Independent   Housing / Facility 2 Story House   Steps Into Home 3   Steps In Home   (Full flight, however, pt's bed/bath are on the ground floor and pt does not need to access the upper level)   Rail Left Rail  (Steps into Home)   Equipment Owned None   Lives with - Patient's Self Care Capacity Spouse   Comments Pt currently resides with her  who is retired and is able to provide assist as needed.   Cognition    Cognition / Consciousness X   Speech/ Communication Delayed Responses;Expressive Aphasia  (pt would on occasion mix up words and able self correct)   Level of Consciousness Alert   New Learning Impaired   Attention Impaired   Sequencing Impaired   Initiation Impaired   Active ROM Lower Body    Active ROM Lower Body  WDL   Strength Lower Body   Lower Body Strength  X   Comments knee extension 4/5 bilat   Sensation Lower Body   Lower Extremity Sensation   X   Comments dtr stated that pt had numbness in Lower legs, pt with difficult time following commands on light touch. She was able to decipher on legs, min less on toes.   Coordination Lower Body    Coordination Lower Body  WDL   Comments pt not following commands appropriately, would ask pt to extend knee and pt would bend it   Balance Assessment    Sitting Balance (Static) Fair   Sitting Balance (Dynamic) Fair   Standing Balance (Static) Fair -   Standing Balance (Dynamic) Fair -   Weight Shift Sitting Good   Weight Shift Standing Good   Comments pt able to stand at sink without UE support and no AD   Bed Mobility    Comments up in chair pre/post eval   Gait Analysis   Gait Level Of Assist Minimal Assist   Assistive Device None;Hand Held Assist   Distance (Feet) 300   # of Times Distance was Traveled 1   Deviation Bradykinetic;Other (Comment)  (pt with one LOB to left when turning let and while distracted, and pt reacted appropriately and grabbed back of a chair and required min A. Multiple min pathway deviations.)   # of Stairs Climbed 0  (pt requested to practice stairs at another time)   Functional Mobility   Sit to Stand Contact Guard Assist   Bed, Chair, Wheelchair Transfer Contact Guard Assist   Toilet Transfers Contact Guard Assist   Transfer Method Stand Step   6 Clicks Assessment - How much HELP from from another person do you currently need... (If the patient hasn't done an activity recently, how much help from another person do you think he/she would need if he/she tried?)   Turning from your back to your side while in a flat bed without using bedrails? 3   Moving from lying on your back to sitting on the side of a flat bed without using bedrails? 3   Moving to and from a bed to a chair (including a wheelchair)? 3   Standing up from a chair using your arms (e.g., wheelchair, or bedside chair)? 3   Walking in hospital room? 3   Climbing 3-5 steps with a railing? 3   6 clicks Mobility Score 18   Activity Tolerance   Sitting in Chair up to chair after session   Standing 8 minutes   Comments limited by fatigue   Short Term Goals    Short Term Goal # 1 Pt will perform supine to/from sit with flat bed and no features supervised in 6 visits to progress bed mobility   Short Term Goal # 2 Pt will perform sit to/from stand supervised in 6 visits to progress  transfers   Short Term Goal # 3 Pt will amb 200ft supervised in 6 visits to progress with functional household mobility.   Short Term Goal # 4 Pt will be able to ascend/descend 3 stairs with rail SBA to access home.   Education Group   Education Provided Role of Physical Therapist   Role of Physical Therapist Patient Response Patient;Acceptance;Explanation;Verbal Demonstration   Physical Therapy Initial Treatment Plan    Treatment Plan  Bed Mobility;Gait Training;Neuro Re-Education / Balance;Therapeutic Activities;Therapeutic Exercise   Treatment Frequency 4 Times per Week   Duration Until Therapy Goals Met   Problem List    Problems Impaired Bed Mobility;Impaired Transfers;Impaired Ambulation;Decreased Activity Tolerance;Impaired Balance;Safety Awareness Deficits / Cognition   Anticipated Discharge Equipment and Recommendations   DC Equipment Recommendations Unable to determine at this time   Discharge Recommendations Recommend post-acute placement for additional physical therapy services prior to discharge home   Interdisciplinary Plan of Care Collaboration   IDT Collaboration with  Nursing;Family / Caregiver   Patient Position at End of Therapy Seated;Chair Alarm On;Call Light within Reach;Tray Table within Reach;Phone within Reach   Collaboration Comments RN updated

## 2024-08-26 NOTE — THERAPY
"Speech Language Pathology   Daily Treatment     Patient Name: Mariana Dumont  AGE:  68 y.o., SEX:  female  Medical Record #: [id number]  Date of Service: 8/26/2024      Precautions:  Precautions: Fall Risk, Swallow Precautions         Subjective  Pt awake and sitting in chair upon entry. Supportive daughter at bedside. Pt oriented x4 with flat affect.       Assessment  Pt awake and agreeable to therapy. Per daughter, pt with improved mentation and pt is eager for upgraded diet. She consumed trials of thin liquids via single and consecutive cup sips, purees, and solid with no overt s/sx of aspiration. Mastication timely and vocal quality was clear following the swallow. She denied globus sensation and denied difficulty with mastication.       Clinical Impressions  Pt is presenting with improved mentation and swallow function since last seen for FEES on 8/24. Recommend upgrade to a regular/thin liquid diet. Please hold with any difficulty.    Recommendations  Treatment Completed: Dysphagia Treatment       Dysphagia Treatment  Diet Consistency: regular/thin liquids  Instrumentation: None indicated at this time  Medication: As tolerated  Supervision: Independent  Positioning: Fully upright and midline during oral intake  Risk Management : Small bites/sips  Oral Care: BID        SLP Treatment Plan  Treatment Plan: Dysphagia Treatment, Patient/Family/Caregiver Training  SLP Frequency: 3x Per Week  Estimated Duration: Until Therapy Goals Met      Anticipated Discharge Needs  Discharge Recommendations: Recommend post-acute placement for additional speech therapy services prior to discharge home  Therapy Recommendations Upon DC: Dysphagia Training, Community Re-Integration, Patient / Family / Caregiver Education      Patient / Family Goals  Patient / Family Goal #1: \"Water\"  Goal #1 Outcome: Progressing as expected  Short Term Goals  Short Term Goal # 1: Pt will complete FEES w SLP to further evaluate swallow function and " inform POC.  Goal Outcome # 1: Goal met, new goal added  Short Term Goal # 1 B : Pt will consume diet of PU/TN given strategy use with no worsening of respiratory status.  Goal Outcome  # 1 B: Goal met  Short Term Goal # 2: Pt will demonstrate appropriate oral phase with advanced solid textures given min cues from SLP.  Goal Outcome # 2 : Goal met  Short Term Goal # 3: Pt will complete exercises targeting LVC, BOT retraction, and pharyngeal squeeze x50 in a session given max cues from SLP.  Goal Outcome  # 3: Goal not met  Short Term Goal # 4: Pt will consume a regular/thin liquid diet with no overt s/sx of aspiration      Jayne Gan, SLP

## 2024-08-26 NOTE — CARE PLAN
The patient is Stable - Low risk of patient condition declining or worsening    Shift Goals  Clinical Goals: Q4H Neuro Checks, SBP<160  Patient Goals: Rest  Family Goals: NIMESH    Progress made toward(s) clinical / shift goals:  see notes attached to careplan problems    Problem: Neuro Status  Goal: Neuro status will remain stable or improve  Outcome: Progressing  Note: Q4H neuro exam stable, agitation/delerium improved      Problem: Mobility  Goal: Patient's capacity to carry out activities will improve  Outcome: Progressing  Note: mobilized       Patient is not progressing towards the following goals:      Problem: Bowel Elimination  Goal: Establish and maintain regular bowel function  Outcome: Not Progressing

## 2024-08-26 NOTE — PROGRESS NOTES
4 Eyes Skin Assessment Completed by TRISTAN Blum and TRISTAN Waldron.    Head WDL  Ears WDL  Nose Redness and Blanching  Mouth WDL  Neck WDL  Breast/Chest WDL  Shoulder Blades WDL  Spine WDL  (R) Arm/Elbow/Hand WDL  (L) Arm/Elbow/Hand WDL  Abdomen WDL  Groin Redness and Blanching  Scrotum/Coccyx/Buttocks Redness and Blanching  (R) Leg WDL  (L) Leg WDL  (R) Heel/Foot/Toe WDL  (L) Heel/Foot/Toe WDL          Devices In Places Tele Box and Pulse Ox      Interventions In Place Pressure Redistribution Mattress    Possible Skin Injury No    Pictures Uploaded Into Epic N/A  Wound Consult Placed N/A  RN Wound Prevention Protocol Ordered No

## 2024-08-26 NOTE — PROGRESS NOTES
"[facility] Medicine Daily Progress Note    Date of Service  8/26/2024    Chief Complaint  Mariana Dumont is a 68 y.o. female admitted 8/22/2024 with strokelike symptoms    Hospital Course  68 y.o. female who presented 8/22/2024 with PMHx HTN, GERD, HLD, RLS.  Pt presented on 8/22 with R side weakness and slurred speech.  Given TNK after neg CNS imaging in the ED in [place].  On arrival to our facility pt was unresponsive and required intubation for airway protection.  Pt admitted to the ICU with consultation from Neurology.     In the unit pt had an EEG showing encepahlopathy and was extubated 8/23.  At that point she reported having taken an unknown amount of a muslce relaxant thought to be cyclobenzaprine.  MRI neg.  She did have a brief run o WCT but Tele has been otherwise unremarkable     I spoke at length with the patient and her family at the bedside.  Patient herself does not recall the events that led to her being altered and intubated, she does not remember anything from that day.  She earlier had told staff that she took extra doses of her muscle relaxants, but she denies that to me in the presence of her family.   thinks she has been a little bit more confused than normal for about a month or so.  In the days leading up to the event, she did not have any complaints of fever or chills, headache, other than the tingling in her legs, she had no complaints.  Family notes that she is much more alert and more like herself, but still not quite as \"sharp\" as she would normally be.    Interval Problem Update  8/26, transferred out of ICU yesterday, patient new to me  Met with patient and family at bedside, they were told by previous physicians here in the hospital that this could might be conversion disorder, patient is now amenable to seeing psychiatry, consult placed  Evaluated by PT/OT, recommended postacute placement, I have placed orders for SNF referral and physiatry evaluation  Patient is eager to go " home but understands need for rehab    I have discussed this patient's plan of care and discharge plan at IDT rounds today with Case Management, Nursing, Nursing leadership, and other members of the IDT team.    Consultants/Specialty  critical care, neurology    Code Status  Full Code    Disposition    I have placed the appropriate orders for post-discharge needs.    Review of Systems  Review of Systems   Constitutional:  Positive for malaise/fatigue. Negative for chills and fever.   HENT:  Negative for congestion and sore throat.    Respiratory:  Negative for cough and shortness of breath.    Cardiovascular:  Negative for chest pain and palpitations.   Gastrointestinal:  Negative for nausea and vomiting.   Genitourinary:  Negative for dysuria and urgency.   Psychiatric/Behavioral:  Negative for suicidal ideas.         Physical Exam  Temp:  [36.2 °C (97.1 °F)-36.7 °C (98 °F)] 36.4 °C (97.6 °F)  Pulse:  [] 105  Resp:  [17-21] 20  BP: ()/(54-83) 104/64  SpO2:  [91 %-95 %] 95 %    Physical Exam  Constitutional:       General: She is not in acute distress.     Appearance: She is not toxic-appearing.   HENT:      Head: Normocephalic and atraumatic.      Nose: Nose normal.      Mouth/Throat:      Mouth: Mucous membranes are moist.      Pharynx: Oropharynx is clear.   Eyes:      Extraocular Movements: Extraocular movements intact.      Conjunctiva/sclera: Conjunctivae normal.   Cardiovascular:      Rate and Rhythm: Normal rate and regular rhythm.      Pulses: Normal pulses.      Heart sounds: Normal heart sounds.   Pulmonary:      Effort: No respiratory distress.      Breath sounds: Normal breath sounds.   Abdominal:      Palpations: Abdomen is soft.      Tenderness: There is no abdominal tenderness.   Musculoskeletal:      Cervical back: Neck supple. No rigidity.   Skin:     General: Skin is warm and dry.   Neurological:      General: No focal deficit present.         Fluids    Intake/Output Summary (Last 24  hours) at 8/26/2024 1411  Last data filed at 8/26/2024 0952  Gross per 24 hour   Intake 460 ml   Output 1050 ml   Net -590 ml       Laboratory  Recent Labs     08/24/24  0535 08/25/24  0546 08/26/24  0242   WBC 9.7 6.9 5.9   RBC 4.50 4.45 4.41   HEMOGLOBIN 13.0 13.1 12.7   HEMATOCRIT 39.7 39.3 38.6   MCV 88.2 88.3 87.5   MCH 28.9 29.4 28.8   MCHC 32.7 33.3 32.9   RDW 41.9 42.8 41.7   PLATELETCT 176 173 206   MPV 9.1 9.3 9.3     Recent Labs     08/24/24  1445 08/25/24  0546 08/26/24  0242   SODIUM 139 139 138   POTASSIUM 3.9 3.9 3.6   CHLORIDE 106 107 104   CO2 20 22 24   GLUCOSE 101* 99 101*   BUN 8 9 14   CREATININE 0.61 0.57 0.55   CALCIUM 9.7 9.4 9.3                   Imaging  US-THYROID   Final Result      Highly suspicious 3.5 cm right lower thyroid nodule.      ACR TI-RADS Recommendations   TR5  (>= 1cm) - Based solely on ultrasound findings, FNA could be considered      Recommendations based on the American College of Radiology Thyroid imaging, reporting and Data System (TI-RADS) 2017.         INTERPRETING LOCATION: [address] [place] [address], [id number]      DX-CHEST-PORTABLE (1 VIEW)   Final Result      Decreased right basilar opacity. No new abnormality.         DX-CHEST-PORTABLE (1 VIEW)   Final Result      Increased right basilar opacity, edema and/or developing pneumonitis.      DX-CHEST-PORTABLE (1 VIEW)   Final Result         1. Lines and tubes as above. NGT can be advanced 5-8 cm.   2. No new consolidation or pleural effusions.         MR-BRAIN-WITH & W/O   Final Result      MRI OF THE BRAIN WITHOUT AND WITH CONTRAST WITHIN NORMAL LIMITS.      DX-CHEST-PORTABLE (1 VIEW)   Final Result      Mild bibasilar opacities, possibly atelectasis.      CT-CTA NECK WITH & W/O-POST PROCESSING   Final Result         1. Atherosclerosis without significant stenosis, aneurysm, or occlusion.   2. Right thyroid nodule may be further assessed with ultrasound.      CT-CTA HEAD WITH & W/O-POST PROCESS   Final Result      CT  "angiogram of the [place] within normal limits.      CT-CEREBRAL PERFUSION ANALYSIS   Final Result      1. Cerebral blood flow less than 30% possibly representing completed infarct = 0 mL. Based on distribution of this finding, this is unlikely to represent artifact.      2. T Max more than 6 seconds possibly representing combination of completed infarct and ischemia = 0 mL. Based on the distribution of this finding, this is unlikely to represent artifact.      3. Mismatched volume possibly representing ischemic brain/penumbra= 0 mL      4.  Please note that this cerebral perfusion study and report is Quantitative and targets supratentorial (cerebral) perfusion for evaluation of large vessel territory acute ischemia/infarction. For example, lacunar infarcts, and brainstem/posterior fossa    ischemia/infarction are not evaluated on this study.  Data acquisition is subject to artifacts which can yield non-anatomically plausible perfusion maps which may be due to motion, bolus timing, signal to noise ratio, or other technical factors.    Perfusion map abnormalities which show non-anatomic distributions are likely artifact.   This study is not \"stand-alone\" and should only be utilized for diagnosis, management/treatment in correlation with CT, CTA, and/or MRI and clinical factors.         CT-HEAD W/O   Final Result      No acute intracranial abnormality.                             Assessment/Plan  * Acute respiratory failure with hypoxia (HCC)  Assessment & Plan  Intubated for airway protection  Extubated August 23  Continue O2 and RT protocols  Mobilize  Incentive spirometry  On room air 8/26      Accidental overdose  Assessment & Plan  Unclear  By report of her attending intensivist, she clearly stated that she took extra of her muscle relaxants.  On my interview with family present, she denies this.  She also states she does not remember anything on the day of the event.  Affect is quite flat on my " examination.  She has apparently been having problems with anxiety over the last few months increasingly a problem for her.    She is now agreeable to see psychiatry, have placed consult    Dyslipidemia  Assessment & Plan  Statin    GERD (gastroesophageal reflux disease)  Assessment & Plan  PPI    Primary hypertension  Assessment & Plan  Maintained on losartan as an outpatient  Resume as pressures indicate    Acute encephalopathy- (present on admission)  Assessment & Plan  Patient had a negative CT, negative MR, metabolic workup has been negative.  No evidence of infectious process.  EEG showed encephalopathic changes, but no focal findings  Scheduled medications would not appear to be involved however there is significant possibility that she intentionally took cyclobenzaprine.  Again history is not clear as noted above  Regardless she is improving without intervention at this point.  This would seem to imply a toxic ingestion  Neurology has evaluated  Continue supportive care and continue to monitor.  Appears to continue improving, is open to psychiatric evaluation for possible FNSD         VTE prophylaxis:    enoxaparin ppx      I have performed a physical exam and reviewed and updated ROS and Plan today (8/26/2024). In review of yesterday's note (8/25/2024), there are no changes except as documented above.

## 2024-08-26 NOTE — PROGRESS NOTES
Pt admitted to room S196-1 via transport and RN in hospital bed from RICU at 22:08.      AA&Ox3. Denies CP/SOB.Tele-sitter at bedside.  See 2 RN skin note. Patient is on a remote cardiac monitor.   Tolerating level 4 diet. Denies N/V.  + void via mcgregor. - BM. Last BM PTA  Pt ambulates X2 assist w/FWW.    Plan of care discussed, all questions answered. Educated on the importance of calling before getting OOB and pt verbalizes understanding. Educated regarding importance of oral care. Oral care kit at bedside. Call light is within reach, treaded slipper socks on, bed in lowest/ locked position, hourly rounding in place, all needs met at this time

## 2024-08-26 NOTE — DISCHARGE PLANNING
[facility] Transitional Care Coordination    Referral from: Dr. Akins    Insurance Provider on Facesheet: [facility]    Potential Rehab Diagnosis: TBD    Chart review indicates patient may have on going medical management and may have therapy needs to possibly meet inpatient rehab facility criteria with the goal of returning to community.    D/C support will need to be verified: Spouse    Physiatry consultation forwarded per protocol.      Thank you for the referral.

## 2024-08-26 NOTE — CARE PLAN
Problem: Knowledge Deficit - Standard  Goal: Patient and family/care givers will demonstrate understanding of plan of care, disease process/condition, diagnostic tests and medications  Outcome: Progressing     Problem: Skin Integrity  Goal: Skin integrity is maintained or improved  Outcome: Progressing     Problem: Fall Risk  Goal: Patient will remain free from falls  Outcome: Progressing     Problem: Pain - Standard  Goal: Alleviation of pain or a reduction in pain to the patient’s comfort goal  Outcome: Progressing     Problem: Neuro Status  Goal: Neuro status will remain stable or improve  Outcome: Progressing     Problem: Bowel Elimination  Goal: Establish and maintain regular bowel function  Outcome: Progressing     Problem: Mobility  Goal: Patient's capacity to carry out activities will improve  Outcome: Progressing   The patient is Stable - Low risk of patient condition declining or worsening    Shift Goals  Clinical Goals: neuro checks, mobility, hemodynamic stability  Patient Goals: rest  Family Goals: NIMESH    Progress made toward(s) clinical / shift goals:  Patient disoriented to situation overnight. Patient declines pain. Intermittently tachy in the low 100s. Patient is subdued, flat affect, quiet.      Patient is not progressing towards the following goals:

## 2024-08-27 ENCOUNTER — PHARMACY VISIT (OUTPATIENT)
Dept: PHARMACY | Facility: MEDICAL CENTER | Age: 69
End: 2024-08-27
Payer: COMMERCIAL

## 2024-08-27 VITALS
WEIGHT: 167.77 LBS | SYSTOLIC BLOOD PRESSURE: 135 MMHG | RESPIRATION RATE: 17 BRPM | OXYGEN SATURATION: 94 % | BODY MASS INDEX: 26.96 KG/M2 | TEMPERATURE: 97.6 F | HEIGHT: 66 IN | DIASTOLIC BLOOD PRESSURE: 79 MMHG | HEART RATE: 111 BPM

## 2024-08-27 PROBLEM — G93.40 ACUTE ENCEPHALOPATHY: Status: RESOLVED | Noted: 2024-08-22 | Resolved: 2024-08-27

## 2024-08-27 PROBLEM — J96.01 ACUTE RESPIRATORY FAILURE WITH HYPOXIA (HCC): Status: RESOLVED | Noted: 2024-08-22 | Resolved: 2024-08-27

## 2024-08-27 LAB
ANION GAP SERPL CALC-SCNC: 12 MMOL/L (ref 7–16)
BASOPHILS # BLD AUTO: 0.3 % (ref 0–1.8)
BASOPHILS # BLD: 0.02 K/UL (ref 0–0.12)
BUN SERPL-MCNC: 15 MG/DL (ref 8–22)
CALCIUM SERPL-MCNC: 9.2 MG/DL (ref 8.5–10.5)
CHLORIDE SERPL-SCNC: 105 MMOL/L (ref 96–112)
CO2 SERPL-SCNC: 22 MMOL/L (ref 20–33)
CREAT SERPL-MCNC: 0.58 MG/DL (ref 0.5–1.4)
EOSINOPHIL # BLD AUTO: 0.1 K/UL (ref 0–0.51)
EOSINOPHIL NFR BLD: 1.7 % (ref 0–6.9)
ERYTHROCYTE [DISTWIDTH] IN BLOOD BY AUTOMATED COUNT: 42.6 FL (ref 35.9–50)
GFR SERPLBLD CREATININE-BSD FMLA CKD-EPI: 97 ML/MIN/1.73 M 2
GLUCOSE SERPL-MCNC: 98 MG/DL (ref 65–99)
HCT VFR BLD AUTO: 40 % (ref 37–47)
HGB BLD-MCNC: 13.2 G/DL (ref 12–16)
IMM GRANULOCYTES # BLD AUTO: 0.03 K/UL (ref 0–0.11)
IMM GRANULOCYTES NFR BLD AUTO: 0.5 % (ref 0–0.9)
LYMPHOCYTES # BLD AUTO: 0.81 K/UL (ref 1–4.8)
LYMPHOCYTES NFR BLD: 13.5 % (ref 22–41)
MAGNESIUM SERPL-MCNC: 2 MG/DL (ref 1.5–2.5)
MCH RBC QN AUTO: 28.9 PG (ref 27–33)
MCHC RBC AUTO-ENTMCNC: 33 G/DL (ref 32.2–35.5)
MCV RBC AUTO: 87.7 FL (ref 81.4–97.8)
MONOCYTES # BLD AUTO: 0.47 K/UL (ref 0–0.85)
MONOCYTES NFR BLD AUTO: 7.9 % (ref 0–13.4)
NEUTROPHILS # BLD AUTO: 4.55 K/UL (ref 1.82–7.42)
NEUTROPHILS NFR BLD: 76.1 % (ref 44–72)
NRBC # BLD AUTO: 0 K/UL
NRBC BLD-RTO: 0 /100 WBC (ref 0–0.2)
PHOSPHATE SERPL-MCNC: 3.1 MG/DL (ref 2.5–4.5)
PLATELET # BLD AUTO: 243 K/UL (ref 164–446)
PMV BLD AUTO: 9.2 FL (ref 9–12.9)
POTASSIUM SERPL-SCNC: 3.7 MMOL/L (ref 3.6–5.5)
RBC # BLD AUTO: 4.56 M/UL (ref 4.2–5.4)
SODIUM SERPL-SCNC: 139 MMOL/L (ref 135–145)
WBC # BLD AUTO: 6 K/UL (ref 4.8–10.8)

## 2024-08-27 PROCEDURE — A9270 NON-COVERED ITEM OR SERVICE: HCPCS | Performed by: INTERNAL MEDICINE

## 2024-08-27 PROCEDURE — RXMED WILLOW AMBULATORY MEDICATION CHARGE: Performed by: INTERNAL MEDICINE

## 2024-08-27 PROCEDURE — 99239 HOSP IP/OBS DSCHRG MGMT >30: CPT | Performed by: INTERNAL MEDICINE

## 2024-08-27 PROCEDURE — 85025 COMPLETE CBC W/AUTO DIFF WBC: CPT

## 2024-08-27 PROCEDURE — 700111 HCHG RX REV CODE 636 W/ 250 OVERRIDE (IP): Mod: JZ | Performed by: INTERNAL MEDICINE

## 2024-08-27 PROCEDURE — 700105 HCHG RX REV CODE 258: Performed by: NURSE PRACTITIONER

## 2024-08-27 PROCEDURE — 99223 1ST HOSP IP/OBS HIGH 75: CPT | Performed by: PHYSICAL MEDICINE & REHABILITATION

## 2024-08-27 PROCEDURE — 700102 HCHG RX REV CODE 250 W/ 637 OVERRIDE(OP): Performed by: INTERNAL MEDICINE

## 2024-08-27 PROCEDURE — 83735 ASSAY OF MAGNESIUM: CPT

## 2024-08-27 PROCEDURE — A9270 NON-COVERED ITEM OR SERVICE: HCPCS | Performed by: PHYSICAL MEDICINE & REHABILITATION

## 2024-08-27 PROCEDURE — 84100 ASSAY OF PHOSPHORUS: CPT

## 2024-08-27 PROCEDURE — 80048 BASIC METABOLIC PNL TOTAL CA: CPT

## 2024-08-27 PROCEDURE — 99222 1ST HOSP IP/OBS MODERATE 55: CPT | Mod: GC | Performed by: STUDENT IN AN ORGANIZED HEALTH CARE EDUCATION/TRAINING PROGRAM

## 2024-08-27 PROCEDURE — 700102 HCHG RX REV CODE 250 W/ 637 OVERRIDE(OP): Performed by: PHYSICAL MEDICINE & REHABILITATION

## 2024-08-27 RX ORDER — QUETIAPINE FUMARATE 25 MG/1
25 TABLET, FILM COATED ORAL NIGHTLY
Qty: 30 TABLET | Refills: 0 | Status: SHIPPED | OUTPATIENT
Start: 2024-08-27

## 2024-08-27 RX ORDER — DULOXETIN HYDROCHLORIDE 30 MG/1
30 CAPSULE, DELAYED RELEASE ORAL DAILY
Qty: 30 CAPSULE | Refills: 0 | Status: SHIPPED | OUTPATIENT
Start: 2024-08-28

## 2024-08-27 RX ORDER — DULOXETIN HYDROCHLORIDE 30 MG/1
30 CAPSULE, DELAYED RELEASE ORAL DAILY
Status: DISCONTINUED | OUTPATIENT
Start: 2024-08-27 | End: 2024-08-27 | Stop reason: HOSPADM

## 2024-08-27 RX ADMIN — OMEPRAZOLE 40 MG: 20 CAPSULE, DELAYED RELEASE ORAL at 04:53

## 2024-08-27 RX ADMIN — HYDRALAZINE HYDROCHLORIDE 20 MG: 20 INJECTION INTRAMUSCULAR; INTRAVENOUS at 00:37

## 2024-08-27 RX ADMIN — DULOXETINE HYDROCHLORIDE 30 MG: 30 CAPSULE, DELAYED RELEASE ORAL at 11:25

## 2024-08-27 RX ADMIN — SODIUM CHLORIDE, POTASSIUM CHLORIDE, SODIUM LACTATE AND CALCIUM CHLORIDE: 600; 310; 30; 20 INJECTION, SOLUTION INTRAVENOUS at 08:46

## 2024-08-27 ASSESSMENT — ENCOUNTER SYMPTOMS
HALLUCINATIONS: 0
DEPRESSION: 0
NERVOUS/ANXIOUS: 1

## 2024-08-27 ASSESSMENT — FIBROSIS 4 INDEX: FIB4 SCORE: 1.55

## 2024-08-27 ASSESSMENT — PAIN DESCRIPTION - PAIN TYPE: TYPE: ACUTE PAIN

## 2024-08-27 ASSESSMENT — LIFESTYLE VARIABLES: SUBSTANCE_ABUSE: 0

## 2024-08-27 NOTE — DISCHARGE SUMMARY
"Discharge Summary    CHIEF COMPLAINT ON ADMISSION  Chief Complaint   Patient presents with    Possible Stroke     BIB CF from [facility] for slurred speech and right sided weakness that started at 1100 today. Patient received TNK at 1253 and was transferred to [facility]. Patient GCS 6 on arrival. Patient taken to scanner with Neurology bedside.        Reason for Admission  STROKE     Admission Date  8/22/2024    CODE STATUS  Full Code    HPI & HOSPITAL COURSE  68 y.o. female who presented 8/22/2024 with PMHx HTN, GERD, HLD, RLS.  Pt presented on 8/22 with R side weakness and slurred speech.  Given TNK after neg CNS imaging in the ED in [place].  On arrival to our facility pt was unresponsive and required intubation for airway protection.  Pt admitted to the ICU with consultation from Neurology.     In the unit pt had an EEG showing encepahlopathy and was extubated 8/23.  At that point she reported having taken an unknown amount of a muslce relaxant thought to be cyclobenzaprine.  MRI brain was negative for acute process.  She did have a brief run of WCT on Tele which resolved.     Patient herself does not recall the events that led to her being altered and intubated, she does not remember anything from that day.  She earlier had told staff that she took extra doses of her muscle relaxants, but she denies that to me in the presence of her family.   thinks she has been a little bit more confused than normal for about a month or so.  In the days leading up to the event, she did not have any complaints of fever or chills, headache, other than the tingling in her legs, she had no complaints.  Family notes that she is much more alert and more like herself, but still not quite as \"sharp\" as she would normally be.    There was concern for conversion disorder and generalized anxiety disorder for which psychiatry was consulted.  Patient was started on Seroquel and Cymbalta and given referral for outpatient psychiatry " and PCP follow-up.  She was noted to have a thyroid nodule and was recommended outpatient follow-up with ultrasound.  Patient was evaluated by physiatry and was recommended home health.  Family has decided to do outpatient physical therapy at [facility] for which I provided them with a prescription for PT OT.    Therefore, she is discharged in good and stable condition to home with close outpatient follow-up.    The patient met 2-midnight criteria for an inpatient stay at the time of discharge.    Discharge Date  8/27/24    FOLLOW UP ITEMS POST DISCHARGE  PCP and psychiatry    DISCHARGE DIAGNOSES  Principal Problem (Resolved):    Acute respiratory failure with hypoxia (HCC) (POA: Unknown)  Active Problems:    Primary hypertension (POA: Unknown)    GERD (gastroesophageal reflux disease) (POA: Unknown)    Dyslipidemia (POA: Unknown)    Thyroid nodule (POA: Unknown)    Accidental overdose (POA: Unknown)  Resolved Problems:    Acute encephalopathy (POA: Yes)    Overdose by acetaminophen, undetermined intent, sequela (POA: Unknown)      FOLLOW UP  Future Appointments   Date Time Provider Department Center   9/3/2024 11:40 AM Sravani Carvajal P.A.-C. [facility] Primary Care Appointment  Tuesday, September 3, 2024     11:25 AM with RICO Carrizales   [address] [place] [id number]  Go on 9/3/2024  Video Call is an option and was discussed with  and patient. Can be accessed through Phone Warrior.     Please arrive 15 minutes early. If patient is 10 minutes late appointment will be canceled.      MEDICATIONS ON DISCHARGE     Medication List        START taking these medications        Instructions   DULoxetine 30 MG Cpep  Start taking on: August 28, 2024  Commonly known as: Cymbalta   Take 1 Capsule by mouth every day.  Dose: 30 mg     QUEtiapine 25 MG Tabs  Commonly known as: SEROquel   Take 1 Tablet by mouth every evening.  Dose: 25 mg            CONTINUE taking these medications        Instructions    CENTRUM PO   Take 1 Tablet by mouth every day.  Dose: 1 Tablet     losartan 50 MG Tabs  Commonly known as: Cozaar   Take 50 mg by mouth every day. FOR 90 DAYS  Dose: 50 mg     omeprazole 40 MG delayed-release capsule  Commonly known as: PriLOSEC   Take 1 Capsule by mouth every day for 30 days.  Dose: 40 mg     simvastatin 40 MG Tabs  Commonly known as: Zocor   Take 40 mg by mouth at bedtime.  Dose: 40 mg              Allergies  Allergies   Allergen Reactions    Sulfa Drugs Hives       DIET  Orders Placed This Encounter   Procedures    Diet Order Diet: Regular     Standing Status:   Standing     Number of Occurrences:   1     Order Specific Question:   Diet:     Answer:   Regular [1]       ACTIVITY  As tolerated.  Weight bearing as tolerated    CONSULTATIONS  Neurology  Physiatry      PROCEDURES  none    LABORATORY  Lab Results   Component Value Date    SODIUM 139 08/27/2024    POTASSIUM 3.7 08/27/2024    CHLORIDE 105 08/27/2024    CO2 22 08/27/2024    GLUCOSE 98 08/27/2024    BUN 15 08/27/2024    CREATININE 0.58 08/27/2024        Lab Results   Component Value Date    WBC 6.0 08/27/2024    HEMOGLOBIN 13.2 08/27/2024    HEMATOCRIT 40.0 08/27/2024    PLATELETCT 243 08/27/2024        Total time of the discharge process exceeds 33 minutes.

## 2024-08-27 NOTE — DISCHARGE PLANNING
Physiatry to consult.     1027-Please review the consult from Dr. Stephenson regarding post acute recommendations.  TCC will no longer follow.  Please reach out to myself with any questions.

## 2024-08-27 NOTE — DISCHARGE PLANNING
Case Management Discharge Planning    Admission Date: 8/22/2024  GMLOS: 3.9  ALOS: 5    6-Clicks ADL Score: 15  6-Clicks Mobility Score: 18  PT and/or OT Eval ordered: Yes  Post-acute Referrals Ordered: Yes  Post-acute Choice Obtained: NA  Has referral(s) been sent to post-acute provider:  KAITY      Anticipated Discharge Dispo: Discharge Disposition: Disch to a long term care facility (63)    DME Needed: No    Action(s) Taken: Updated Provider/Nurse on Discharge Plan    Escalations Completed: None    Medically Clear: No    Next Steps: RNCM obtained PCP for patient:     Tuesday, September 3, 2024   11:25 AM with Brittnee Carvajal   [address] [place] [id number]  Video Call is an option and was discussed with  and patient. Can be accessed through GIVVER     Barriers to Discharge: Medical clearance

## 2024-08-27 NOTE — CARE PLAN
The patient is Stable - Low risk of patient condition declining or worsening    Shift Goals  Clinical Goals: Monitor neuro status  Patient Goals: Sleep  Family Goals: NIMESH    Progress made toward(s) clinical / shift goals:    Problem: Fall Risk  Goal: Patient will remain free from falls  Description: Target End Date:  Prior to discharge or change in level of care    Document interventions on the [facility] Fall Risk Assessment    1.  Assess for fall risk factors  2.  Implement fall precautions  Outcome: Progressing     Problem: Neuro Status  Goal: Neuro status will remain stable or improve  Description: Target End Date:  Prior to discharge or change in level of care    Document on Neuro assessment in the Assessment flowsheet    1.  Assess and monitor neurologic status per provider order/protocol/unit policy  2.  Assess level of consciousness and orientation  3.  Assess for speech, dysarthria, dysphagia, facial symmetry  4.  Assess visual field, eye movements, gaze preference, pupil reaction and size  5.  Assess muscle strength and motor response in all four extremities  6.  Assess for sensation (numbness and tingling)  7.  Assess basic neuro reflexes (cough, gag, corneal)  8.  Identify changes in neuro status and report to provider for testing/treatment orders  Outcome: Progressing     Problem: Knowledge Deficit - Standard  Goal: Patient and family/care givers will demonstrate understanding of plan of care, disease process/condition, diagnostic tests and medications  Description: Target End Date:  1-3 days or as soon as patient condition allows    Document in Patient Education    1.  Patient and family/caregiver oriented to unit, equipment, visitation policy and means for communicating concern  2.  Complete/review Learning Assessment  3.  Assess knowledge level of disease process/condition, treatment plan, diagnostic tests and medications  4.  Explain disease process/condition, treatment plan, diagnostic tests and  medications  Outcome: Progressing       Patient is not progressing towards the following goals:

## 2024-08-27 NOTE — CONSULTS
Physical Medicine and Rehabilitation Consultation          Date of initial consultation: 8/27/2024  Consulting provider: Dinesh Akins M.D.   Reason for consultation: assess for acute inpatient rehab appropriateness  LOS: 5 Day(s)    Chief complaint: Encephalopathy    HPI: The patient is a 68 y.o. right hand dominant female with a past medical history of hypertension, GERD hyperlipidemia, RLS;  who presented on 8/22/2024  2:10 PM with right-sided weakness and slurred speech.  Patient was seen in the emergency department in [facility], treated with TNK, and sent here for neurology workup.  Patient's NIH score here was 31 on admission.  MR brain showed no acute pathology.  EEG found moderate encephalopathy    The patient currently reports that she is getting back to normal. She is not walking at her baseline rate but she is walking greater than household distances with out AD, her thinking is back on track. She reports numbness and tingling from her neck to her toes but her sensation is intact. Spouse at bedside is retired and able to provide 24/7 support.     ROS  Pertinent positives are mentioned in the HPI, all others reviewed and are negative.    Social Hx:  2 SH  3 MADELAINE  With: Spouse     THERAPY:  Restrictions: Fall risk  PT: Functional mobility   8/26: Walking 300 feet at min assist without AD    OT: ADLs  8/26: Min assist eating, max assist lower body dressing    SLP:   8/26: Regular diet, thin liquids    IMAGING:  MRI brain 8/22/2024  MRI OF THE BRAIN WITHOUT AND WITH CONTRAST WITHIN NORMAL LIMITS.     PROCEDURES:  EEG 8/22/2024 Josef Turner MD  This is an abnormal video EEG recording in the obtunded state.  1) The diffuse background slowing is consistent with moderate encephalopathy.    2) No epileptiform discharges or seizures were seen. This does not preclude a diagnosis of epilepsy.     PMH:  Past Medical History:   Diagnosis Date    Cancer (HCC)        PSH:  Past Surgical History:   Procedure  "Laterality Date    ABDOMINAL HYSTERECTOMY TOTAL      CHOLECYSTECTOMY      PRIMARY C SECTION         FHX:  Non-pertinent to today's issues    Medications:  Current Facility-Administered Medications   Medication Dose    senna-docusate (Pericolace Or Senokot S) 8.6-50 MG per tablet 2 Tablet  2 Tablet    And    polyethylene glycol/lytes (Miralax) Packet 1 Packet  1 Packet    simvastatin (Zocor) tablet 40 mg  40 mg    acetaminophen (Tylenol) tablet 650 mg  650 mg    ondansetron (Zofran ODT) dispertab 4 mg  4 mg    omeprazole (PriLOSEC) capsule 40 mg  40 mg    enoxaparin (Lovenox) inj 40 mg  40 mg    QUEtiapine (SEROquel) tablet 50 mg  50 mg    HYDROmorphone (Dilaudid) injection 0.5 mg  0.5 mg    ondansetron (Zofran) syringe/vial injection 4 mg  4 mg    Respiratory Therapy Consult      ipratropium-albuterol (DUONEB) nebulizer solution  3 mL    hydrALAZINE (Apresoline) injection 20 mg  20 mg    labetalol (Normodyne/Trandate) injection 10-20 mg  10-20 mg    lactated ringers infusion         Allergies:  Allergies   Allergen Reactions    Sulfa Drugs Hives         Physical Exam:  Vitals: BP (!) 157/94   Pulse 90   Temp 36.3 °C (97.4 °F) (Temporal)   Resp 17   Ht 1.676 m (5' 6\")   Wt 76.1 kg (167 lb 12.3 oz)   SpO2 92%   Gen: NAD  Head:  NC/AT  Eyes/ Nose/ Mouth: PERRLA, moist mucous membranes  Cardio: RRR, good distal perfusion, warm extremities  Pulm: normal respiratory effort, no cyanosis   Abd: Soft NTND, negative borborygmi   Ext: No peripheral edema. No calf tenderness. No clubbing.    Mental status:  A&Ox4 (person, place, date, situation) answers questions appropriately follows commands  Speech: fluent, no aphasia or dysarthria      Motor:      Upper Extremity  Myotome R L   Shoulder flexion C5 5 5   Elbow flexion C5 5 5   Wrist extension C6 5 5   Elbow extension C7 5 5   Finger flexion C8 5 5   Finger abduction T1 5 5     Lower Extremity Myotome R L   Hip flexion L2 5 5   Knee extension L3 5 5   Ankle dorsiflexion " L4 5 5   Toe extension L5 5 5   Ankle plantarflexion S1 5 5     Sensory:   intact to light touch through out    Coordination:   intact finger pepe bilaterally  Intact gross motor in UEs bilaterally     Labs: Reviewed and significant for   Recent Labs     08/25/24 0546 08/26/24 0242 08/27/24 0522   RBC 4.45 4.41 4.56   HEMOGLOBIN 13.1 12.7 13.2   HEMATOCRIT 39.3 38.6 40.0   PLATELETCT 173 206 243     Recent Labs     08/25/24 0546 08/26/24 0242 08/27/24 0522   SODIUM 139 138 139   POTASSIUM 3.9 3.6 3.7   CHLORIDE 107 104 105   CO2 22 24 22   GLUCOSE 99 101* 98   BUN 9 14 15   CREATININE 0.57 0.55 0.58   CALCIUM 9.4 9.3 9.2     Recent Results (from the past 24 hour(s))   Basic Metabolic Panel (BMP)    Collection Time: 08/27/24  5:22 AM   Result Value Ref Range    Sodium 139 135 - 145 mmol/L    Potassium 3.7 3.6 - 5.5 mmol/L    Chloride 105 96 - 112 mmol/L    Co2 22 20 - 33 mmol/L    Glucose 98 65 - 99 mg/dL    Bun 15 8 - 22 mg/dL    Creatinine 0.58 0.50 - 1.40 mg/dL    Calcium 9.2 8.5 - 10.5 mg/dL    Anion Gap 12.0 7.0 - 16.0   CBC with Differential    Collection Time: 08/27/24  5:22 AM   Result Value Ref Range    WBC 6.0 4.8 - 10.8 K/uL    RBC 4.56 4.20 - 5.40 M/uL    Hemoglobin 13.2 12.0 - 16.0 g/dL    Hematocrit 40.0 37.0 - 47.0 %    MCV 87.7 81.4 - 97.8 fL    MCH 28.9 27.0 - 33.0 pg    MCHC 33.0 32.2 - 35.5 g/dL    RDW 42.6 35.9 - 50.0 fL    Platelet Count 243 164 - 446 K/uL    MPV 9.2 9.0 - 12.9 fL    Neutrophils-Polys 76.10 (H) 44.00 - 72.00 %    Lymphocytes 13.50 (L) 22.00 - 41.00 %    Monocytes 7.90 0.00 - 13.40 %    Eosinophils 1.70 0.00 - 6.90 %    Basophils 0.30 0.00 - 1.80 %    Immature Granulocytes 0.50 0.00 - 0.90 %    Nucleated RBC 0.00 0.00 - 0.20 /100 WBC    Neutrophils (Absolute) 4.55 1.82 - 7.42 K/uL    Lymphs (Absolute) 0.81 (L) 1.00 - 4.80 K/uL    Monos (Absolute) 0.47 0.00 - 0.85 K/uL    Eos (Absolute) 0.10 0.00 - 0.51 K/uL    Baso (Absolute) 0.02 0.00 - 0.12 K/uL    Immature Granulocytes  (abs) 0.03 0.00 - 0.11 K/uL    NRBC (Absolute) 0.00 K/uL   Magnesium    Collection Time: 08/27/24  5:22 AM   Result Value Ref Range    Magnesium 2.0 1.5 - 2.5 mg/dL   Phosphorus    Collection Time: 08/27/24  5:22 AM   Result Value Ref Range    Phosphorus 3.1 2.5 - 4.5 mg/dL   ESTIMATED GFR    Collection Time: 08/27/24  5:22 AM   Result Value Ref Range    GFR (CKD-EPI) 97 >60 mL/min/1.73 m 2         ASSESSMENT:  Patient is a 68 y.o. female admitted with encephalopathy     Rehabilitation: Impaired ADLs and mobility  Barriers to transfer include: Insurance authorization, TCCs to verify disposition, medical clearance and bed availability. All cases are subject to administrative review.     Disposition recommendations:  - Recommend home with outpatient PT/OT and close follow up with neurology and PCP  -  please set up appointments with Neurology and PCP  -PMR will sign off, please reconsult or reach out via Voalte if further evaluation or medical management is requested    Medical Complexity:    Encephalopathy   - Presented as stroke alert with right sided weakness and slurred speech, NIH 31, received TNK   - MRI brain showed no acute stroke   - EEG found encephalopathy  - Symptoms now resolved   - CLOSE follow up with neurology and PCP     Neuropathic pain  - History of anxiety   - Starting cymbalta 30mg daily      Insomnia   - Seroquel 50mg nightly     GERD   - Prilosec 40mg daily   - Follow up with GI     DVT PPX: Lovenox       Thank you for allowing us to participate in the care of this patient.     Patient was seen for >80 minutes on unit/floor of which > 50% of time was spent on counseling and coordination of care regarding the above, including prognosis, risk reduction, benefits of treatment, and options for next stage of care.    Timothy Stephenson, DO   Physical Medicine and Rehabilitation     Please note that this dictation was created using voice recognition software. I have made every reasonable  attempt to correct obvious errors, but there may be errors of grammar and possibly content that I did not discover before finalizing the note.

## 2024-08-27 NOTE — CONSULTS
PSYCHIATRIC INTAKE EVALUATION (new)  Reason for admission: Acute encephalopathy  Reason for consult: Evaluation for possible functional disorder  Requesting Provider: Oliverio Das M.D.  Legal Hold Status: Not applicable  Chart reviewed.         *HPI: Patient is a 68 y.o. female with history of HTN, GERD, HLD, RLS who was brought to the hospital for symptoms concerning for a stroke, transferring from ED in [place] after receiving TNK and negative head imaging. Pt unresponsive upon arrival to [facility] on 8/22/2024 and admitted to ICU intubated. EEG showed diffuse slowing concerning for encephalopathy. Extubated 8/23. MRI negative. Neurology not concerned for focal deficit explaining global change in presentation. Psychiatry was consulted to consider psychiatric etiologies given unremarkable workup thus far.    Pt was interviewed in room initially with , Manjinder, present. Manjinder provided initial history. Manjinder says he started noticing changes in the pt's mood about four to five months ago. Around that time, the family was more preoccupied with preparing for their daughter's wedding (had been preparing for the last year) and the pt was more energetic and busy. However in the aftermath of the wedding, it appears the pt seemed to withdrawal from her usual activities. She seemed less energetic, stayed indoors more (typically would go outside to garden but would say it's too hot and would instead stay inside), and would not read (typically an avid reader, reading 2-3 books a week). The  could not cite other specific changes, but said these changes happened gradually and were subtle. More recently, the pt and her  went on vacation with their daughter and her . The trip was mostly unremarkable, but Manjinder did mention there was a dinner in which he had a disagreement with his daughter, saying she wasn't being mindful of the pt's dietary needs (couldn't eat many foods because of GERD) and this apparently  "stressed the pt out. After returning from this vacation about a week ago, the  could not cite any other acute changes. Immediately prior to this hospitalization, the  does not believe the pt took an excessive amount of her Flexeril. The first acute signs he noticed prior to hospitalization was when the pt was in the kitchen washing dishes. She had dropped a dish, and when the  came to see what was the issue, he felt like she was having a stroke with changes in her speech and one-sided changes in facial expression, prompting ED visit in [place].    At this point, the  excused himself, and the pt provided the following history. She describes herself as a \"worry wart,\" always has been. Generally tends to worry about things almost to excess because she cares about people and tends to take things to heart. She does mention some examples of recent things that were stressful, including plans to renovate a bathroom in the home. She said she was worried about the finances to pay for the renovations(though she then said she knew they should have enough to cover the costs), then she worried about only having one useable bathroom during the 10 days of projected construction. She also worried about having people coming in/out of the home. Another recent example of a stressful topic were ongoing conversations with her  about what they'd do if either passed. She does find that her anxiety makes it hard to concentrate on things at times and she generally feels a little more easily fatigued especially these last several months.    She has also noticed some changes to her sleep, previously sleeping a routine 8-9 hours nightly and the last few months sleeping around 6 hours with no clear reason for the decrease. She does not describe unintended early awakening. She has noticed being more isolated at home and spending less time with friends and gardening d/t the weather. She does feel somewhat lower " "energy in general and poorer concentration as mentioned above. Appetite would be normal but is decreased because she has GERD and needs to avoid many foods, though she would find certain foods appetizing if she could eat them. She does feel generally sluggish and physically slower. However she strongly denies SI, saying she has \"too much to live for.\" Denies excessive guilt or feeling hopeless/helpless. Denies overtly feeling depressed.    Psychiatric ROS:  Depression: Per HPI  Steph: Denies any current or past episodes of decreased need for sleep associated with euphoric or irritable mood.  Anxiety: Per HPI  PTSD: Denies any past trauma and associated symptoms.  Psychosis: Denies AVH. No evidence of paranoia or other delusions.  Substances: Denies history of or current use or abuse of substances    Medical ROS (as pertinent):     Review of Systems   Psychiatric/Behavioral:  Negative for depression, hallucinations, substance abuse and suicidal ideas. The patient is nervous/anxious.        *Psychiatric Examination:  Vitals:   Vitals:    08/27/24 0758   BP: 135/79   Pulse: (!) 106   Resp: 17   Temp: 36.4 °C (97.5 °F)   SpO2: 92%        General Appearance: Appears state age, appropriate grooming & hygiene, no acute distress  Behavior:    -Appropriate activity, appropriate eye contact, pleasant & cooperative   -No tremors noted   -No psychomotor agitation or retardation   -No posture or gait abnormalities appreciated  Speech: Appropriate quantity, spontaneous. Regular rate and rhythm. Appropriate volume and intonation. Articulation is clear  Language: English  Thought processes: Coherent, linear, logical and goal-directed. No evidence of loose associations  Thought content: No evidence of SI/HI/AVH. Not observed responding to internal stimuli. No evidence of delusions or paranoia  Mood: Okay  Affect: Congruent with stated mood. Somewhat restricted range and blunted affect, though appropriately reactive to conversation " and responds with some humor and brightened affect. No evidence of lability, wiliam, or agitation.  Judgement and Insight and Impulse control: Good/poor-fair/good  Cognition:    -Alert & oriented x 3 (Person, place and time)   -Attention & concentration grossly intact   -Immediate/delayed memory not formally tested but grossly intact   -Age-appropriate fund of knowledge    Past Medical History:   Past Medical History:   Diagnosis Date    Cancer (HCC)         Past Psychiatric History:  Previous diagnosis: Denies  Current meds: Denies  Previous med trials: Denies  Hospitalizations: Denies  Suicide attempts/SIB: Denies  Outpatient services: Denies  Access to guns: Many at home  Abuse/trauma hx: Denies  Legal hx: Denies    Family Hx:   Denies family psychiatric history.    Social Hx:   Both  and pt retired, living together.    Substances:  Denies all substance use, including EtOH, tobacco, marijuana, and other recreational substances.    Current Medications:  Current Facility-Administered Medications   Medication Dose Route Frequency Provider Last Rate Last Admin    DULoxetine (Cymbalta) capsule 30 mg  30 mg Oral DAILY Timothy Stephenson D.O.        senna-docusate (Pericolace Or Senokot S) 8.6-50 MG per tablet 2 Tablet  2 Tablet Oral Q EVENING Solitario Hoskins M.D.   2 Tablet at 08/26/24 1734    And    polyethylene glycol/lytes (Miralax) Packet 1 Packet  1 Packet Oral QDAY PRN Solitario Hoskisn M.D.        simvastatin (Zocor) tablet 40 mg  40 mg Oral Q EVENING Solitario Hoskins M.D.   40 mg at 08/26/24 1734    acetaminophen (Tylenol) tablet 650 mg  650 mg Oral Q6HRS PRN Solitario Hoskins M.D.        ondansetron (Zofran ODT) dispertab 4 mg  4 mg Oral Q4HRS PRN Solitario Hoskins M.D.        omeprazole (PriLOSEC) capsule 40 mg  40 mg Oral DAILY Solitario Hoskins M.D.   40 mg at 08/27/24 0453    enoxaparin (Lovenox) inj 40 mg  40 mg Subcutaneous DAILY AT 1800 Solitario Hoskins M.D.   40 mg at 08/26/24 1734    QUEtiapine (SEROquel)  tablet 50 mg  50 mg Oral Nightly Shirley Hoskins M.D.   50 mg at 24    HYDROmorphone (Dilaudid) injection 0.5 mg  0.5 mg Intravenous Q HOUR PRN Tete L. Latona   0.5 mg at 24 0255    ondansetron (Zofran) syringe/vial injection 4 mg  4 mg Intravenous Q4HRS PRN Roberth Johnson M.D.        Respiratory Therapy Consult   Nebulization Continuous RT Roberth Johnson M.D.        ipratropium-albuterol (DUONEB) nebulizer solution  3 mL Nebulization Q2HRS PRN (RT) Roberth Johnson M.D.        hydrALAZINE (Apresoline) injection 20 mg  20 mg Intravenous Q6HRS PRN Roberth Johnson M.D.   20 mg at 24 0037    labetalol (Normodyne/Trandate) injection 10-20 mg  10-20 mg Intravenous Q4HRS PRN Roberth Johnson M.D.   10 mg at 24 2209    lactated ringers infusion   Intravenous Continuous Tete GAINES Latlilo 75 mL/hr at 24 0846 New Bag at 24 0846        Allergies:  Sulfa drugs     Labs personally reviewed:   Recent Results (from the past 72 hour(s))   EKG    Collection Time: 24  2:16 PM   Result Value Ref Range    Report       [facility]    Test Date:  2024  Pt Name:    LAQUITA LOFTON               Department: [facility]  MRN:        [id number]                      Room:       R102  Gender:     Female                       Technician: MJEH  :        1955                   Requested By:SHIRLEY HOSKINS  Order #:    [id number]                    Reading MD: Camille Gray    Measurements  Intervals                                Axis  Rate:       89                           P:          47  KY:         166                          QRS:        12  QRSD:       108                          T:          10  QT:         367  QTc:        447    Interpretive Statements  Sinus rhythm  Abnormal R-wave progression, late transition  Electronically Signed On 2024 15:01:47 PDT by Camille Gray     MAGNESIUM    Collection Time: 24  2:45 PM   Result Value Ref  Range    Magnesium 2.1 1.5 - 2.5 mg/dL   PHOSPHORUS    Collection Time: 08/24/24  2:45 PM   Result Value Ref Range    Phosphorus 2.3 (L) 2.5 - 4.5 mg/dL   Basic Metabolic Panel    Collection Time: 08/24/24  2:45 PM   Result Value Ref Range    Sodium 139 135 - 145 mmol/L    Potassium 3.9 3.6 - 5.5 mmol/L    Chloride 106 96 - 112 mmol/L    Co2 20 20 - 33 mmol/L    Glucose 101 (H) 65 - 99 mg/dL    Bun 8 8 - 22 mg/dL    Creatinine 0.61 0.50 - 1.40 mg/dL    Calcium 9.7 8.5 - 10.5 mg/dL    Anion Gap 13.0 7.0 - 16.0   ESTIMATED GFR    Collection Time: 08/24/24  2:45 PM   Result Value Ref Range    GFR (CKD-EPI) 96 >60 mL/min/1.73 m 2   CBC with Differential    Collection Time: 08/25/24  5:46 AM   Result Value Ref Range    WBC 6.9 4.8 - 10.8 K/uL    RBC 4.45 4.20 - 5.40 M/uL    Hemoglobin 13.1 12.0 - 16.0 g/dL    Hematocrit 39.3 37.0 - 47.0 %    MCV 88.3 81.4 - 97.8 fL    MCH 29.4 27.0 - 33.0 pg    MCHC 33.3 32.2 - 35.5 g/dL    RDW 42.8 35.9 - 50.0 fL    Platelet Count 173 164 - 446 K/uL    MPV 9.3 9.0 - 12.9 fL    Neutrophils-Polys 76.10 (H) 44.00 - 72.00 %    Lymphocytes 12.50 (L) 22.00 - 41.00 %    Monocytes 7.90 0.00 - 13.40 %    Eosinophils 2.60 0.00 - 6.90 %    Basophils 0.30 0.00 - 1.80 %    Immature Granulocytes 0.60 0.00 - 0.90 %    Nucleated RBC 0.00 0.00 - 0.20 /100 WBC    Neutrophils (Absolute) 5.28 1.82 - 7.42 K/uL    Lymphs (Absolute) 0.87 (L) 1.00 - 4.80 K/uL    Monos (Absolute) 0.55 0.00 - 0.85 K/uL    Eos (Absolute) 0.18 0.00 - 0.51 K/uL    Baso (Absolute) 0.02 0.00 - 0.12 K/uL    Immature Granulocytes (abs) 0.04 0.00 - 0.11 K/uL    NRBC (Absolute) 0.00 K/uL   Basic Metabolic Panel (BMP)    Collection Time: 08/25/24  5:46 AM   Result Value Ref Range    Sodium 139 135 - 145 mmol/L    Potassium 3.9 3.6 - 5.5 mmol/L    Chloride 107 96 - 112 mmol/L    Co2 22 20 - 33 mmol/L    Glucose 99 65 - 99 mg/dL    Bun 9 8 - 22 mg/dL    Creatinine 0.57 0.50 - 1.40 mg/dL    Calcium 9.4 8.5 - 10.5 mg/dL    Anion Gap 10.0 7.0  - 16.0   Magnesium    Collection Time: 08/25/24  5:46 AM   Result Value Ref Range    Magnesium 2.1 1.5 - 2.5 mg/dL   Phosphorus    Collection Time: 08/25/24  5:46 AM   Result Value Ref Range    Phosphorus 2.7 2.5 - 4.5 mg/dL   ESTIMATED GFR    Collection Time: 08/25/24  5:46 AM   Result Value Ref Range    GFR (CKD-EPI) 98 >60 mL/min/1.73 m 2   FREE THYROXINE    Collection Time: 08/25/24  4:20 PM   Result Value Ref Range    Free T-4 1.22 0.93 - 1.70 ng/dL   CBC with Differential    Collection Time: 08/26/24  2:42 AM   Result Value Ref Range    WBC 5.9 4.8 - 10.8 K/uL    RBC 4.41 4.20 - 5.40 M/uL    Hemoglobin 12.7 12.0 - 16.0 g/dL    Hematocrit 38.6 37.0 - 47.0 %    MCV 87.5 81.4 - 97.8 fL    MCH 28.8 27.0 - 33.0 pg    MCHC 32.9 32.2 - 35.5 g/dL    RDW 41.7 35.9 - 50.0 fL    Platelet Count 206 164 - 446 K/uL    MPV 9.3 9.0 - 12.9 fL    Neutrophils-Polys 69.70 44.00 - 72.00 %    Lymphocytes 18.70 (L) 22.00 - 41.00 %    Monocytes 8.30 0.00 - 13.40 %    Eosinophils 2.70 0.00 - 6.90 %    Basophils 0.30 0.00 - 1.80 %    Immature Granulocytes 0.30 0.00 - 0.90 %    Nucleated RBC 0.00 0.00 - 0.20 /100 WBC    Neutrophils (Absolute) 4.08 1.82 - 7.42 K/uL    Lymphs (Absolute) 1.10 1.00 - 4.80 K/uL    Monos (Absolute) 0.49 0.00 - 0.85 K/uL    Eos (Absolute) 0.16 0.00 - 0.51 K/uL    Baso (Absolute) 0.02 0.00 - 0.12 K/uL    Immature Granulocytes (abs) 0.02 0.00 - 0.11 K/uL    NRBC (Absolute) 0.00 K/uL   Magnesium    Collection Time: 08/26/24  2:42 AM   Result Value Ref Range    Magnesium 2.0 1.5 - 2.5 mg/dL   Phosphorus    Collection Time: 08/26/24  2:42 AM   Result Value Ref Range    Phosphorus 2.7 2.5 - 4.5 mg/dL   Comp Metabolic Panel    Collection Time: 08/26/24  2:42 AM   Result Value Ref Range    Sodium 138 135 - 145 mmol/L    Potassium 3.6 3.6 - 5.5 mmol/L    Chloride 104 96 - 112 mmol/L    Co2 24 20 - 33 mmol/L    Anion Gap 10.0 7.0 - 16.0    Glucose 101 (H) 65 - 99 mg/dL    Bun 14 8 - 22 mg/dL    Creatinine 0.55 0.50 -  1.40 mg/dL    Calcium 9.3 8.5 - 10.5 mg/dL    Correct Calcium 9.9 8.5 - 10.5 mg/dL    AST(SGOT) 24 12 - 45 U/L    ALT(SGPT) 27 2 - 50 U/L    Alkaline Phosphatase 87 30 - 99 U/L    Total Bilirubin 0.5 0.1 - 1.5 mg/dL    Albumin 3.2 3.2 - 4.9 g/dL    Total Protein 6.1 6.0 - 8.2 g/dL    Globulin 2.9 1.9 - 3.5 g/dL    A-G Ratio 1.1 g/dL   ESTIMATED GFR    Collection Time: 08/26/24  2:42 AM   Result Value Ref Range    GFR (CKD-EPI) 99 >60 mL/min/1.73 m 2   Basic Metabolic Panel (BMP)    Collection Time: 08/27/24  5:22 AM   Result Value Ref Range    Sodium 139 135 - 145 mmol/L    Potassium 3.7 3.6 - 5.5 mmol/L    Chloride 105 96 - 112 mmol/L    Co2 22 20 - 33 mmol/L    Glucose 98 65 - 99 mg/dL    Bun 15 8 - 22 mg/dL    Creatinine 0.58 0.50 - 1.40 mg/dL    Calcium 9.2 8.5 - 10.5 mg/dL    Anion Gap 12.0 7.0 - 16.0   CBC with Differential    Collection Time: 08/27/24  5:22 AM   Result Value Ref Range    WBC 6.0 4.8 - 10.8 K/uL    RBC 4.56 4.20 - 5.40 M/uL    Hemoglobin 13.2 12.0 - 16.0 g/dL    Hematocrit 40.0 37.0 - 47.0 %    MCV 87.7 81.4 - 97.8 fL    MCH 28.9 27.0 - 33.0 pg    MCHC 33.0 32.2 - 35.5 g/dL    RDW 42.6 35.9 - 50.0 fL    Platelet Count 243 164 - 446 K/uL    MPV 9.2 9.0 - 12.9 fL    Neutrophils-Polys 76.10 (H) 44.00 - 72.00 %    Lymphocytes 13.50 (L) 22.00 - 41.00 %    Monocytes 7.90 0.00 - 13.40 %    Eosinophils 1.70 0.00 - 6.90 %    Basophils 0.30 0.00 - 1.80 %    Immature Granulocytes 0.50 0.00 - 0.90 %    Nucleated RBC 0.00 0.00 - 0.20 /100 WBC    Neutrophils (Absolute) 4.55 1.82 - 7.42 K/uL    Lymphs (Absolute) 0.81 (L) 1.00 - 4.80 K/uL    Monos (Absolute) 0.47 0.00 - 0.85 K/uL    Eos (Absolute) 0.10 0.00 - 0.51 K/uL    Baso (Absolute) 0.02 0.00 - 0.12 K/uL    Immature Granulocytes (abs) 0.03 0.00 - 0.11 K/uL    NRBC (Absolute) 0.00 K/uL   Magnesium    Collection Time: 08/27/24  5:22 AM   Result Value Ref Range    Magnesium 2.0 1.5 - 2.5 mg/dL   Phosphorus    Collection Time: 08/27/24  5:22 AM   Result  Value Ref Range    Phosphorus 3.1 2.5 - 4.5 mg/dL   ESTIMATED GFR    Collection Time: 24  5:22 AM   Result Value Ref Range    GFR (CKD-EPI) 97 >60 mL/min/1.73 m 2           EKG:   Results for orders placed or performed during the hospital encounter of 24   EKG (NOW)   Result Value Ref Range    Report       [facility] Emergency Dept.    Test Date:  2024  Pt Name:    LAQUITA LOFTON               Department: ER  MRN:        [id number]                      Room:       R102  Gender:     Female                       Technician: [id number]  :        1955                   Requested By:CARLY HELTON  Order #:    [id number]                    Reading MD: Carly Helton MD    Measurements  Intervals                                Axis  Rate:       101                          P:          50  KY:         197                          QRS:        39  QRSD:       107                          T:          10  QT:         367  QTc:        476    Interpretive Statements  Sinus tachycardia  Minimal ST depression, anterolateral leads  No STEMI  No previous ECG available for comparison  Electronically Signed On 2024 18:00:52 PDT by Carly Helton MD     EKG   Result Value Ref Range    Report       [facility]    Test Date:  2024  Pt Name:    LAQUITA LOFTON               Department: [facility]  MRN:        [id number]                      Room:       R102  Gender:     Female                       Technician: MJEH  :        1955                   Requested By:SHIRLEY MATTHEW  Order #:    [id number]                    Reading MD: Camille Gray    Measurements  Intervals                                Axis  Rate:       89                           P:          47  KY:         166                          QRS:        12  QRSD:       108                          T:          10  QT:         367  QTc:        447    Interpretive Statements  Sinus rhythm  Abnormal R-wave progression, late  "transition  Electronically Signed On 2024 15:01:47 PDT by Camille Gray        Brain Imaging:   CT-perfusion 2024  IMPRESSION:     1. Cerebral blood flow less than 30% possibly representing completed infarct = 0 mL. Based on distribution of this finding, this is unlikely to represent artifact.     2. T Max more than 6 seconds possibly representing combination of completed infarct and ischemia = 0 mL. Based on the distribution of this finding, this is unlikely to represent artifact.     3. Mismatched volume possibly representing ischemic brain/penumbra= 0 mL     4.  Please note that this cerebral perfusion study and report is Quantitative and targets supratentorial (cerebral) perfusion for evaluation of large vessel territory acute ischemia/infarction. For example, lacunar infarcts, and brainstem/posterior fossa   ischemia/infarction are not evaluated on this study.  Data acquisition is subject to artifacts which can yield non-anatomically plausible perfusion maps which may be due to motion, bolus timing, signal to noise ratio, or other technical factors.   Perfusion map abnormalities which show non-anatomic distributions are likely artifact.   This study is not \"stand-alone\" and should only be utilized for diagnosis, management/treatment in correlation with CT, CTA, and/or MRI and clinical factors.    CTA-head 2024  IMPRESSION:     CT angiogram of the [place] within normal limits.    EE2024  INTERPRETATION:     This is an abnormal video EEG recording in the obtunded state.  1) The diffuse background slowing is consistent with moderate encephalopathy.    2) No epileptiform discharges or seizures were seen. This does not preclude a diagnosis of epilepsy.     Assessment:  Patient is a 68 y.o. female with history of HTN, GERD, HLD, RLS who was brought to the hospital for symptoms concerning for a stroke s/p TNK transferred from [place]. Workup for CVA and other organic causes " unremarkable during hospitalization and pt appears to be returning to baseline presentation after ICU admission and extubation.     Unclear at this time the etiology of the altered mentation leading to initial hospitalization. Could be that there was a TIA that was missed or partly treated when administered TNK, though this would be unusual to then present with such global altered mentation when presented to [facility]. It does appear, however, there is a long history of an anxiety disorder that the pt had not previously sought treatment for. She describes a longstanding generalized anxiety that does appear to affect her energy and ability to concentrate, and does appear clinically significant impairment in multiple domains of functioning (mostly personal, as the pt is retired, but more withdrawn from typical activities such as gardening and reading, possible excessive worry about daughter's wedding and other small home improvement projects). She has a history of muscle spasms for which she takes the Flexeril, which may hint at some somatic symptoms related to anxiety. And she also describes nonspecific pins/needles sensations of her extremities without motor changes or back pain. Therefore, after discussing these findings, it was determined that the pt may benefit from the Cymbalta that was started by PM&R for her neuropathic pain, as this doubles as an excellent anxiolytic and antidepressant. She has tolerated it thus far, though only received one dose. Discussed the risks and benefits extensively of initiating an SNRI, including GI upset, sexual side effects, rare risk of serotonin syndrome and SIADH, and discontinuation syndrome. The pt agrees to continue the medication and was amenable to follow-up with outpatient psychiatry for continued management. She was provided with a list of resources for outpatient follow-up, including [place]-specific resources for their convenience.     Dx:  #CHUCHO  Rule out depressive  disorder    Medical:  Per primary team    Plan:  Legal hold: Not applicable  Psychotropic medications:  Continue Cymbalta 30mg per PM&R for neuropathic pain and anxiety, can f/u outpatient for titration  Labs/EKG/records reviewed  Collateral obtained, , Manjinder  Discussed the case with: Dr. Harris  Psychiatry will sign off, recommend outpatient follow-up, resources provided    Thank you for the consult!

## 2024-09-03 ENCOUNTER — HOSPITAL ENCOUNTER (OUTPATIENT)
Dept: RADIOLOGY | Facility: MEDICAL CENTER | Age: 69
End: 2024-09-03
Payer: MEDICARE

## 2024-09-03 ENCOUNTER — APPOINTMENT (OUTPATIENT)
Dept: MEDICAL GROUP | Age: 69
End: 2024-09-03
Payer: MEDICARE

## 2024-09-03 DIAGNOSIS — E04.1 THYROID NODULE: ICD-10-CM

## 2024-09-03 LAB — CYTOLOGY REG CYTOL: NORMAL

## 2024-09-03 PROCEDURE — 10005 FNA BX W/US GDN 1ST LES: CPT

## 2024-09-03 PROCEDURE — 88173 CYTOPATH EVAL FNA REPORT: CPT
